# Patient Record
Sex: FEMALE | Race: OTHER | NOT HISPANIC OR LATINO | URBAN - METROPOLITAN AREA
[De-identification: names, ages, dates, MRNs, and addresses within clinical notes are randomized per-mention and may not be internally consistent; named-entity substitution may affect disease eponyms.]

---

## 2018-01-18 ENCOUNTER — OUTPATIENT (OUTPATIENT)
Dept: OUTPATIENT SERVICES | Facility: HOSPITAL | Age: 33
LOS: 1 days | Discharge: HOME | End: 2018-01-18

## 2018-01-18 DIAGNOSIS — D72.829 ELEVATED WHITE BLOOD CELL COUNT, UNSPECIFIED: ICD-10-CM

## 2018-01-22 ENCOUNTER — RESULT REVIEW (OUTPATIENT)
Age: 33
End: 2018-01-22

## 2018-01-22 ENCOUNTER — OUTPATIENT (OUTPATIENT)
Dept: OUTPATIENT SERVICES | Facility: HOSPITAL | Age: 33
LOS: 1 days | End: 2018-01-22
Payer: COMMERCIAL

## 2018-01-22 ENCOUNTER — APPOINTMENT (OUTPATIENT)
Dept: HEMATOLOGY ONCOLOGY | Facility: CLINIC | Age: 33
End: 2018-01-22
Payer: COMMERCIAL

## 2018-01-22 VITALS
RESPIRATION RATE: 16 BRPM | BODY MASS INDEX: 45.69 KG/M2 | HEART RATE: 85 BPM | DIASTOLIC BLOOD PRESSURE: 85 MMHG | HEIGHT: 62.6 IN | SYSTOLIC BLOOD PRESSURE: 129 MMHG | TEMPERATURE: 98.5 F | OXYGEN SATURATION: 98 % | WEIGHT: 254.63 LBS

## 2018-01-22 DIAGNOSIS — D47.3 ESSENTIAL (HEMORRHAGIC) THROMBOCYTHEMIA: ICD-10-CM

## 2018-01-22 DIAGNOSIS — Z78.9 OTHER SPECIFIED HEALTH STATUS: ICD-10-CM

## 2018-01-22 DIAGNOSIS — D72.829 ELEVATED WHITE BLOOD CELL COUNT, UNSPECIFIED: ICD-10-CM

## 2018-01-22 DIAGNOSIS — E03.9 HYPOTHYROIDISM, UNSPECIFIED: ICD-10-CM

## 2018-01-22 LAB
BASOPHILS # BLD AUTO: 0.1 K/UL — SIGNIFICANT CHANGE UP (ref 0–0.2)
BASOPHILS NFR BLD AUTO: 0.7 % — SIGNIFICANT CHANGE UP (ref 0–2)
EOSINOPHIL # BLD AUTO: 0.3 K/UL — SIGNIFICANT CHANGE UP (ref 0–0.5)
EOSINOPHIL NFR BLD AUTO: 2.1 % — SIGNIFICANT CHANGE UP (ref 0–6)
HCT VFR BLD CALC: 40.9 % — SIGNIFICANT CHANGE UP (ref 34.5–45)
HGB BLD-MCNC: 13.5 G/DL — SIGNIFICANT CHANGE UP (ref 11.5–15.5)
LYMPHOCYTES # BLD AUTO: 24.1 % — SIGNIFICANT CHANGE UP (ref 13–44)
LYMPHOCYTES # BLD AUTO: 3.3 K/UL — SIGNIFICANT CHANGE UP (ref 1–3.3)
MCHC RBC-ENTMCNC: 24.9 PG — LOW (ref 27–34)
MCHC RBC-ENTMCNC: 32.9 G/DL — SIGNIFICANT CHANGE UP (ref 32–36)
MCV RBC AUTO: 75.5 FL — LOW (ref 80–100)
MONOCYTES # BLD AUTO: 0.9 K/UL — SIGNIFICANT CHANGE UP (ref 0–0.9)
MONOCYTES NFR BLD AUTO: 6.6 % — SIGNIFICANT CHANGE UP (ref 2–14)
NEUTROPHILS # BLD AUTO: 9.2 K/UL — HIGH (ref 1.8–7.4)
NEUTROPHILS NFR BLD AUTO: 66.4 % — SIGNIFICANT CHANGE UP (ref 43–77)
PLATELET # BLD AUTO: 474 K/UL — HIGH (ref 150–400)
RBC # BLD: 5.42 M/UL — HIGH (ref 3.8–5.2)
RBC # FLD: 15 % — HIGH (ref 10.3–14.5)
WBC # BLD: 13.8 K/UL — HIGH (ref 3.8–10.5)
WBC # FLD AUTO: 13.8 K/UL — HIGH (ref 3.8–10.5)

## 2018-01-22 PROCEDURE — 88184 FLOWCYTOMETRY/ TC 1 MARKER: CPT

## 2018-01-22 PROCEDURE — 81207 BCR/ABL1 GENE MINOR BP: CPT

## 2018-01-22 PROCEDURE — 88189 FLOWCYTOMETRY/READ 16 & >: CPT

## 2018-01-22 PROCEDURE — 88185 FLOWCYTOMETRY/TC ADD-ON: CPT

## 2018-01-22 PROCEDURE — 81206 BCR/ABL1 GENE MAJOR BP: CPT

## 2018-01-22 PROCEDURE — G0452: CPT | Mod: 26

## 2018-01-22 PROCEDURE — 81270 JAK2 GENE: CPT

## 2018-01-22 PROCEDURE — 99204 OFFICE O/P NEW MOD 45 MIN: CPT

## 2018-01-22 RX ORDER — LEVOTHYROXINE SODIUM 0.05 MG/1
50 TABLET ORAL DAILY
Qty: 30 | Refills: 0 | Status: ACTIVE | COMMUNITY
Start: 2018-01-22

## 2018-01-23 LAB — BCR/ABL BY RT - PCR QUANTITATIVE: SIGNIFICANT CHANGE UP

## 2018-01-25 LAB
JAK2 P.V617F BLD/T QL: SIGNIFICANT CHANGE UP
TM INTERPRETATION: SIGNIFICANT CHANGE UP

## 2018-02-18 ENCOUNTER — TRANSCRIPTION ENCOUNTER (OUTPATIENT)
Age: 33
End: 2018-02-18

## 2018-06-11 ENCOUNTER — RESULT REVIEW (OUTPATIENT)
Age: 33
End: 2018-06-11

## 2019-01-05 ENCOUNTER — TRANSCRIPTION ENCOUNTER (OUTPATIENT)
Age: 34
End: 2019-01-05

## 2020-04-25 ENCOUNTER — MESSAGE (OUTPATIENT)
Age: 35
End: 2020-04-25

## 2020-05-01 ENCOUNTER — APPOINTMENT (OUTPATIENT)
Dept: DISASTER EMERGENCY | Facility: HOSPITAL | Age: 35
End: 2020-05-01

## 2020-05-02 LAB
SARS-COV-2 IGG SERPL IA-ACNC: <0.1 INDEX
SARS-COV-2 IGG SERPL QL IA: NEGATIVE

## 2020-05-06 ENCOUNTER — APPOINTMENT (OUTPATIENT)
Dept: DISASTER EMERGENCY | Facility: CLINIC | Age: 35
End: 2020-05-06

## 2020-08-02 ENCOUNTER — TRANSCRIPTION ENCOUNTER (OUTPATIENT)
Age: 35
End: 2020-08-02

## 2021-03-17 ENCOUNTER — NON-APPOINTMENT (OUTPATIENT)
Age: 36
End: 2021-03-17

## 2021-03-17 ENCOUNTER — APPOINTMENT (OUTPATIENT)
Dept: DERMATOLOGY | Facility: CLINIC | Age: 36
End: 2021-03-17
Payer: COMMERCIAL

## 2021-03-17 VITALS — BODY MASS INDEX: 40.48 KG/M2 | WEIGHT: 220 LBS | HEIGHT: 62 IN

## 2021-03-17 DIAGNOSIS — L30.1 DYSHIDROSIS [POMPHOLYX]: ICD-10-CM

## 2021-03-17 DIAGNOSIS — L72.3 SEBACEOUS CYST: ICD-10-CM

## 2021-03-17 PROCEDURE — 99204 OFFICE O/P NEW MOD 45 MIN: CPT

## 2021-03-17 PROCEDURE — 99072 ADDL SUPL MATRL&STAF TM PHE: CPT

## 2021-03-26 ENCOUNTER — NON-APPOINTMENT (OUTPATIENT)
Age: 36
End: 2021-03-26

## 2021-03-26 ENCOUNTER — APPOINTMENT (OUTPATIENT)
Dept: DERMATOLOGY | Facility: CLINIC | Age: 36
End: 2021-03-26
Payer: COMMERCIAL

## 2021-03-26 ENCOUNTER — LABORATORY RESULT (OUTPATIENT)
Age: 36
End: 2021-03-26

## 2021-03-26 PROCEDURE — 11441 EXC FACE-MM B9+MARG 0.6-1 CM: CPT | Mod: GC

## 2021-03-26 PROCEDURE — 13151 CMPLX RPR E/N/E/L 1.1-2.5 CM: CPT | Mod: GC

## 2021-03-26 PROCEDURE — 99072 ADDL SUPL MATRL&STAF TM PHE: CPT

## 2021-04-01 ENCOUNTER — NON-APPOINTMENT (OUTPATIENT)
Age: 36
End: 2021-04-01

## 2021-05-14 ENCOUNTER — LABORATORY RESULT (OUTPATIENT)
Age: 36
End: 2021-05-14

## 2021-05-14 ENCOUNTER — APPOINTMENT (OUTPATIENT)
Dept: DERMATOLOGY | Facility: CLINIC | Age: 36
End: 2021-05-14
Payer: COMMERCIAL

## 2021-05-14 DIAGNOSIS — L72.9 FOLLICULAR CYST OF THE SKIN AND SUBCUTANEOUS TISSUE, UNSPECIFIED: ICD-10-CM

## 2021-05-14 DIAGNOSIS — D48.9 NEOPLASM OF UNCERTAIN BEHAVIOR, UNSPECIFIED: ICD-10-CM

## 2021-05-14 DIAGNOSIS — D23.9 OTHER BENIGN NEOPLASM OF SKIN, UNSPECIFIED: ICD-10-CM

## 2021-05-14 PROCEDURE — 12031 INTMD RPR S/A/T/EXT 2.5 CM/<: CPT

## 2021-05-14 PROCEDURE — 99072 ADDL SUPL MATRL&STAF TM PHE: CPT

## 2021-05-14 PROCEDURE — 11422 EXC H-F-NK-SP B9+MARG 1.1-2: CPT

## 2021-05-16 PROBLEM — L72.9 SCALP CYST: Status: ACTIVE | Noted: 2021-05-16

## 2021-05-16 PROBLEM — D23.9 PILOMATRIXOMA: Status: ACTIVE | Noted: 2021-05-14

## 2021-05-16 PROBLEM — D48.9 NEOPLASM OF UNCERTAIN BEHAVIOR: Status: ACTIVE | Noted: 2021-03-26

## 2021-05-21 ENCOUNTER — NON-APPOINTMENT (OUTPATIENT)
Age: 36
End: 2021-05-21

## 2021-09-13 ENCOUNTER — OUTPATIENT (OUTPATIENT)
Dept: OUTPATIENT SERVICES | Facility: HOSPITAL | Age: 36
LOS: 1 days | End: 2021-09-13

## 2021-09-13 ENCOUNTER — APPOINTMENT (OUTPATIENT)
Dept: PRIMARY CARE | Facility: HOSPITAL | Age: 36
End: 2021-09-13

## 2021-09-13 VITALS
HEIGHT: 62 IN | HEART RATE: 69 BPM | OXYGEN SATURATION: 100 % | RESPIRATION RATE: 18 BRPM | DIASTOLIC BLOOD PRESSURE: 70 MMHG | WEIGHT: 235 LBS | SYSTOLIC BLOOD PRESSURE: 128 MMHG | TEMPERATURE: 98.1 F | BODY MASS INDEX: 43.24 KG/M2

## 2021-09-13 DIAGNOSIS — Z20.822 CONTACT WITH AND (SUSPECTED) EXPOSURE TO COVID-19: ICD-10-CM

## 2021-09-13 DIAGNOSIS — Z00.00 ENCOUNTER FOR GENERAL ADULT MEDICAL EXAMINATION W/OUT ABNORMAL FINDINGS: ICD-10-CM

## 2021-09-13 LAB
COVID-19 SPIKE DOMAIN ANTIBODY INTERPRETATION: POSITIVE
SARS-COV-2 AB SERPL IA-ACNC: >250 U/ML

## 2021-09-14 DIAGNOSIS — Z20.822 CONTACT WITH AND (SUSPECTED) EXPOSURE TO COVID-19: ICD-10-CM

## 2021-09-14 DIAGNOSIS — Z00.00 ENCOUNTER FOR GENERAL ADULT MEDICAL EXAMINATION WITHOUT ABNORMAL FINDINGS: ICD-10-CM

## 2021-09-14 LAB
ALBUMIN SERPL ELPH-MCNC: 4.5 G/DL
ALP BLD-CCNC: 63 U/L
ALT SERPL-CCNC: 17 U/L
ANION GAP SERPL CALC-SCNC: 17 MMOL/L
AST SERPL-CCNC: 25 U/L
BASOPHILS # BLD AUTO: 0.11 K/UL
BASOPHILS NFR BLD AUTO: 1.2 %
BILIRUB SERPL-MCNC: 0.3 MG/DL
BUN SERPL-MCNC: 17 MG/DL
CALCIUM SERPL-MCNC: 9.9 MG/DL
CHLORIDE SERPL-SCNC: 103 MMOL/L
CHOLEST SERPL-MCNC: 217 MG/DL
CO2 SERPL-SCNC: 18 MMOL/L
CREAT SERPL-MCNC: 0.51 MG/DL
EOSINOPHIL # BLD AUTO: 0.17 K/UL
EOSINOPHIL NFR BLD AUTO: 1.8 %
ESTIMATED AVERAGE GLUCOSE: 114 MG/DL
GLUCOSE SERPL-MCNC: 74 MG/DL
HBA1C MFR BLD HPLC: 5.6 %
HCT VFR BLD CALC: 48.2 %
HDLC SERPL-MCNC: 65 MG/DL
HGB BLD-MCNC: 15.3 G/DL
IMM GRANULOCYTES NFR BLD AUTO: 0.5 %
LDLC SERPL CALC-MCNC: 138 MG/DL
LYMPHOCYTES # BLD AUTO: 3.19 K/UL
LYMPHOCYTES NFR BLD AUTO: 33.6 %
MAN DIFF?: NORMAL
MCHC RBC-ENTMCNC: 27.5 PG
MCHC RBC-ENTMCNC: 31.7 GM/DL
MCV RBC AUTO: 86.7 FL
MONOCYTES # BLD AUTO: 0.59 K/UL
MONOCYTES NFR BLD AUTO: 6.2 %
NEUTROPHILS # BLD AUTO: 5.39 K/UL
NEUTROPHILS NFR BLD AUTO: 56.7 %
NONHDLC SERPL-MCNC: 152 MG/DL
PLATELET # BLD AUTO: 417 K/UL
POTASSIUM SERPL-SCNC: 4.4 MMOL/L
PROT SERPL-MCNC: 8.2 G/DL
RBC # BLD: 5.56 M/UL
RBC # FLD: 13.2 %
SARS-COV-2 N GENE NPH QL NAA+PROBE: NOT DETECTED
SODIUM SERPL-SCNC: 138 MMOL/L
TRIGL SERPL-MCNC: 73 MG/DL
TSH SERPL-ACNC: 1.08 UIU/ML
WBC # FLD AUTO: 9.5 K/UL

## 2022-01-13 ENCOUNTER — RESULT REVIEW (OUTPATIENT)
Age: 37
End: 2022-01-13

## 2022-04-15 ENCOUNTER — OUTPATIENT (OUTPATIENT)
Dept: OUTPATIENT SERVICES | Facility: HOSPITAL | Age: 37
LOS: 1 days | End: 2022-04-15
Payer: COMMERCIAL

## 2022-04-15 VITALS
OXYGEN SATURATION: 98 % | RESPIRATION RATE: 16 BRPM | DIASTOLIC BLOOD PRESSURE: 70 MMHG | HEIGHT: 62.5 IN | SYSTOLIC BLOOD PRESSURE: 134 MMHG | TEMPERATURE: 98 F | HEART RATE: 84 BPM | WEIGHT: 240.08 LBS

## 2022-04-15 DIAGNOSIS — N93.9 ABNORMAL UTERINE AND VAGINAL BLEEDING, UNSPECIFIED: ICD-10-CM

## 2022-04-15 DIAGNOSIS — Z98.890 OTHER SPECIFIED POSTPROCEDURAL STATES: Chronic | ICD-10-CM

## 2022-04-15 DIAGNOSIS — Z30.430 ENCOUNTER FOR INSERTION OF INTRAUTERINE CONTRACEPTIVE DEVICE: ICD-10-CM

## 2022-04-15 DIAGNOSIS — E03.9 HYPOTHYROIDISM, UNSPECIFIED: ICD-10-CM

## 2022-04-15 DIAGNOSIS — I10 ESSENTIAL (PRIMARY) HYPERTENSION: ICD-10-CM

## 2022-04-15 LAB
ANION GAP SERPL CALC-SCNC: 14 MMOL/L — SIGNIFICANT CHANGE UP (ref 7–14)
BUN SERPL-MCNC: 13 MG/DL — SIGNIFICANT CHANGE UP (ref 7–23)
CALCIUM SERPL-MCNC: 9.4 MG/DL — SIGNIFICANT CHANGE UP (ref 8.4–10.5)
CHLORIDE SERPL-SCNC: 104 MMOL/L — SIGNIFICANT CHANGE UP (ref 98–107)
CO2 SERPL-SCNC: 22 MMOL/L — SIGNIFICANT CHANGE UP (ref 22–31)
CREAT SERPL-MCNC: 0.61 MG/DL — SIGNIFICANT CHANGE UP (ref 0.5–1.3)
EGFR: 117 ML/MIN/1.73M2 — SIGNIFICANT CHANGE UP
GLUCOSE SERPL-MCNC: 84 MG/DL — SIGNIFICANT CHANGE UP (ref 70–99)
HCG UR QL: NEGATIVE — SIGNIFICANT CHANGE UP
HCT VFR BLD CALC: 42.9 % — SIGNIFICANT CHANGE UP (ref 34.5–45)
HGB BLD-MCNC: 13.7 G/DL — SIGNIFICANT CHANGE UP (ref 11.5–15.5)
MCHC RBC-ENTMCNC: 27.3 PG — SIGNIFICANT CHANGE UP (ref 27–34)
MCHC RBC-ENTMCNC: 31.9 GM/DL — LOW (ref 32–36)
MCV RBC AUTO: 85.5 FL — SIGNIFICANT CHANGE UP (ref 80–100)
NRBC # BLD: 0 /100 WBCS — SIGNIFICANT CHANGE UP
NRBC # FLD: 0 K/UL — SIGNIFICANT CHANGE UP
PLATELET # BLD AUTO: 411 K/UL — HIGH (ref 150–400)
POTASSIUM SERPL-MCNC: 4 MMOL/L — SIGNIFICANT CHANGE UP (ref 3.5–5.3)
POTASSIUM SERPL-SCNC: 4 MMOL/L — SIGNIFICANT CHANGE UP (ref 3.5–5.3)
RBC # BLD: 5.02 M/UL — SIGNIFICANT CHANGE UP (ref 3.8–5.2)
RBC # FLD: 14.6 % — HIGH (ref 10.3–14.5)
SODIUM SERPL-SCNC: 140 MMOL/L — SIGNIFICANT CHANGE UP (ref 135–145)
WBC # BLD: 13.11 K/UL — HIGH (ref 3.8–10.5)
WBC # FLD AUTO: 13.11 K/UL — HIGH (ref 3.8–10.5)

## 2022-04-15 PROCEDURE — 93010 ELECTROCARDIOGRAM REPORT: CPT

## 2022-04-15 RX ORDER — SODIUM CHLORIDE 9 MG/ML
1000 INJECTION, SOLUTION INTRAVENOUS
Refills: 0 | Status: DISCONTINUED | OUTPATIENT
Start: 2022-04-29 | End: 2022-05-13

## 2022-04-15 NOTE — H&P PST ADULT - HISTORY OF PRESENT ILLNESS
36 year old female with pre op dx of abnormal uterine and vaginal bleeding unspecified is scheduled for dilation curettage hysteroscopy with myosure , Mirena intrauterine device insertion

## 2022-04-15 NOTE — H&P PST ADULT - NSANTHOSAYNRD_GEN_A_CORE
PT AMBULATED TO BED 8 WITH STEADY GAIT No. CAROL screening performed.  STOP BANG Legend: 0-2 = LOW Risk; 3-4 = INTERMEDIATE Risk; 5-8 = HIGH Risk

## 2022-04-15 NOTE — H&P PST ADULT - ASSESSMENT
pre op dx of abnormal uterine and vaginal bleeding unspecified is scheduled for dilation curettage hysteroscopy with myosure , Mirena intrauterine device insertion    pre op dx of abnormal uterine and vaginal bleeding unspecified Abnormal uterine and vaginal bleeding unspecified

## 2022-04-15 NOTE — H&P PST ADULT - PROBLEM SELECTOR PLAN 1
Patient tentatively scheduled for  dilation curettage hysteroscopy with myosure , Mirena intrauterine device insertion on 04/29/2022.  Pre-op instructions provided. Pt given verbal and written instructions with teach back on pepcid. Pt verbalized understanding with return demonstration.   Pt strongly advised to follow up with surgeon to discuss COVID testing requirements prior to procedure.   Urine cup provided for day of procedure pregnancy test.

## 2022-04-15 NOTE — H&P PST ADULT - ATTENDING COMMENTS
36 year old female with  abnormal uterine bleeding  scheduled for dilation curettage hysteroscopy with possible Myosure resectoscope and  Mirena intrauterine device insertion. Patient with increased risk of OCP use due to BMI and agrees with Mirena IUD for abnormal bleeding to stop OCP that are not successful in helping with abnormal bleeding. All pros/cons, benefits/risks and associated potential complications were discussed and she signed informed consent.

## 2022-04-15 NOTE — H&P PST ADULT - NSICDXPASTMEDICALHX_GEN_ALL_CORE_FT
PAST MEDICAL HISTORY:  HTN (hypertension)     Hypothyroid     LVH (left ventricular hypertrophy)      PAST MEDICAL HISTORY:  HTN (hypertension)     Hypothyroid     LVH (left ventricular hypertrophy) diagnosed 7 years ago

## 2022-04-28 ENCOUNTER — TRANSCRIPTION ENCOUNTER (OUTPATIENT)
Age: 37
End: 2022-04-28

## 2022-04-28 NOTE — ASU PATIENT PROFILE, ADULT - NSICDXPASTMEDICALHX_GEN_ALL_CORE_FT
PAST MEDICAL HISTORY:  HTN (hypertension)     Hypothyroid     LVH (left ventricular hypertrophy) diagnosed 7 years ago

## 2022-04-28 NOTE — ASU PATIENT PROFILE, ADULT - TOBACCO USE
Implemented All Universal Safety Interventions:  [place] to call system. Call bell, personal items and telephone within reach. Instruct patient to call for assistance. Room bathroom lighting operational. Non-slip footwear when patient is off stretcher. Physically safe environment: no spills, clutter or unnecessary equipment. Stretcher in lowest position, wheels locked, appropriate side rails in place.
Never smoker

## 2022-04-28 NOTE — ASU PATIENT PROFILE, ADULT - FALL HARM RISK - PATIENT NEEDS ASSISTANCE
"Chief Complaint   Patient presents with     Consult     New Patient.       /68 (BP Location: Right arm, Patient Position: Sitting, Cuff Size: Adult Regular)   Pulse 53   Ht 5' 8.58\" (174.2 cm)   Wt 159 lb 13.3 oz (72.5 kg)   BMI 23.89 kg/m      Roxann Masters, Student Medical Assistant  June 14, 2019  "
No assistance needed

## 2022-04-29 ENCOUNTER — TRANSCRIPTION ENCOUNTER (OUTPATIENT)
Age: 37
End: 2022-04-29

## 2022-04-29 ENCOUNTER — RESULT REVIEW (OUTPATIENT)
Age: 37
End: 2022-04-29

## 2022-04-29 ENCOUNTER — OUTPATIENT (OUTPATIENT)
Dept: OUTPATIENT SERVICES | Facility: HOSPITAL | Age: 37
LOS: 1 days | Discharge: HOME | End: 2022-04-29
Payer: COMMERCIAL

## 2022-04-29 VITALS
WEIGHT: 240.08 LBS | RESPIRATION RATE: 15 BRPM | HEART RATE: 77 BPM | OXYGEN SATURATION: 98 % | TEMPERATURE: 99 F | HEIGHT: 62.5 IN

## 2022-04-29 VITALS
HEART RATE: 69 BPM | RESPIRATION RATE: 18 BRPM | TEMPERATURE: 97 F | DIASTOLIC BLOOD PRESSURE: 74 MMHG | OXYGEN SATURATION: 100 % | SYSTOLIC BLOOD PRESSURE: 120 MMHG

## 2022-04-29 DIAGNOSIS — Z98.890 OTHER SPECIFIED POSTPROCEDURAL STATES: Chronic | ICD-10-CM

## 2022-04-29 DIAGNOSIS — Z30.430 ENCOUNTER FOR INSERTION OF INTRAUTERINE CONTRACEPTIVE DEVICE: ICD-10-CM

## 2022-04-29 LAB — HCG UR QL: NEGATIVE — SIGNIFICANT CHANGE UP

## 2022-04-29 PROCEDURE — 88305 TISSUE EXAM BY PATHOLOGIST: CPT | Mod: 26

## 2022-04-29 DEVICE — IUD MIRENA: Type: IMPLANTABLE DEVICE | Status: FUNCTIONAL

## 2022-04-29 RX ORDER — LEVOTHYROXINE SODIUM 125 MCG
1 TABLET ORAL
Qty: 0 | Refills: 0 | DISCHARGE

## 2022-04-29 RX ORDER — IBUPROFEN 200 MG
3 TABLET ORAL
Qty: 0 | Refills: 0 | DISCHARGE

## 2022-04-29 RX ORDER — AMLODIPINE BESYLATE 2.5 MG/1
1 TABLET ORAL
Qty: 0 | Refills: 0 | DISCHARGE

## 2022-04-29 RX ORDER — ONDANSETRON 8 MG/1
4 TABLET, FILM COATED ORAL ONCE
Refills: 0 | Status: COMPLETED | OUTPATIENT
Start: 2022-04-29 | End: 2022-04-29

## 2022-04-29 RX ORDER — ACETAMINOPHEN 500 MG
3 TABLET ORAL
Qty: 0 | Refills: 0 | DISCHARGE

## 2022-04-29 RX ORDER — SODIUM CHLORIDE 9 MG/ML
1000 INJECTION, SOLUTION INTRAVENOUS
Refills: 0 | Status: DISCONTINUED | OUTPATIENT
Start: 2022-04-29 | End: 2022-05-13

## 2022-04-29 RX ORDER — LOSARTAN POTASSIUM 100 MG/1
1 TABLET, FILM COATED ORAL
Qty: 0 | Refills: 0 | DISCHARGE

## 2022-04-29 RX ADMIN — ONDANSETRON 4 MILLIGRAM(S): 8 TABLET, FILM COATED ORAL at 11:58

## 2022-04-29 NOTE — ASU DISCHARGE PLAN (ADULT/PEDIATRIC) - NURSING INSTRUCTIONS
You received IV Tylenol for pain management at ___    . Please DO NOT take any Tylenol (Acetaminophen) containing products, such as Vicodin, Percocet, Excedrin, and cold medications for the next 6 hours (until ___      PM). DO NOT TAKE MORE THAN 3000 MG OF TYLENOL in a 24 hour period.    You received IV Toradol for pain management at ___    . Please DO NOT take Motrin/Ibuprofen/Advil/Aleve/NSAIDs (Non-Steroidal Anti-Inflammatory Drugs) for the next 6 hours (until ___      PM).

## 2022-04-29 NOTE — ASU DISCHARGE PLAN (ADULT/PEDIATRIC) - NS MD DC FALL RISK RISK
For information on Fall & Injury Prevention, visit: [email or web address] OR  [email or web address] OR  https://www.cdc.gov/steadi/patient.html

## 2022-04-29 NOTE — ASU PREOP CHECKLIST - AICD PRESENT
I have never seen this patient before. I am not in the office - not sure why sent it to me.  Please forward to appropriate provider to address it asap. Thanks.    no

## 2022-04-29 NOTE — BRIEF OPERATIVE NOTE - OPERATION/FINDINGS
mirena IUD lot [id number] exp. 4/2024 On EUA, uterus in midposition.  Cervical stenosis w/ difficulty dilating, decision made to proceed w/ hysteroscope and hydrodissect.  Cavity w/o intrauterine pathology, proliferative endometrium on the posterior wall.  Uterus sounded to 10 cm. IUD visualized in correct position on sono.  fluid deficit= 300  mirena IUD lot [id number] exp. 4/2024

## 2022-04-29 NOTE — ASU DISCHARGE PLAN (ADULT/PEDIATRIC) - CARE PROVIDER_API CALL
Rachel Negrete)  Obstetrics and Gynecology  [address] [place] [address]  Phone: [phone]  Fax: [phone]  Follow Up Time:

## 2022-04-29 NOTE — ASU DISCHARGE PLAN (ADULT/PEDIATRIC) - ASU DC SPECIAL INSTRUCTIONSFT
Nothing per vagina for 2 weeks- no douching, tampons, sitz baths, sexual intercourse.  Call your doctor and go to the ER if you experience severe discomfort, chest pain, shortness of breath, fever greater than 100.4, of excessive vaginal bleeding greater than 1 pad/hr for 2 consecutive hours.  Take over the counter and prescribed medications for pain control as directed. Follow up with your doctor in 2 weeks.

## 2022-04-29 NOTE — BRIEF OPERATIVE NOTE - NSICDXBRIEFPROCEDURE_GEN_ALL_CORE_FT
PROCEDURES:  Exam under anesthesia, pelvic 29-Apr-2022 09:15:32  Carmela Grant  Hysteroscopy, with dilation and curettage 29-Apr-2022 09:15:43  Carmela Grant  Insertion of Mirena IUD 29-Apr-2022 09:15:50  Carmela Grant

## 2022-05-05 LAB — SURGICAL PATHOLOGY STUDY: SIGNIFICANT CHANGE UP

## 2022-06-22 ENCOUNTER — NON-APPOINTMENT (OUTPATIENT)
Age: 37
End: 2022-06-22

## 2022-07-11 ENCOUNTER — NON-APPOINTMENT (OUTPATIENT)
Age: 37
End: 2022-07-11

## 2022-08-04 PROBLEM — I10 ESSENTIAL (PRIMARY) HYPERTENSION: Chronic | Status: ACTIVE | Noted: 2022-04-15

## 2022-08-04 PROBLEM — I51.7 CARDIOMEGALY: Chronic | Status: ACTIVE | Noted: 2022-04-15

## 2022-08-04 PROBLEM — E03.9 HYPOTHYROIDISM, UNSPECIFIED: Chronic | Status: ACTIVE | Noted: 2022-04-15

## 2022-08-28 ENCOUNTER — APPOINTMENT (OUTPATIENT)
Dept: MRI IMAGING | Facility: IMAGING CENTER | Age: 37
End: 2022-08-28

## 2022-09-03 ENCOUNTER — APPOINTMENT (OUTPATIENT)
Dept: ULTRASOUND IMAGING | Facility: IMAGING CENTER | Age: 37
End: 2022-09-03

## 2022-09-03 ENCOUNTER — OUTPATIENT (OUTPATIENT)
Dept: OUTPATIENT SERVICES | Facility: HOSPITAL | Age: 37
LOS: 1 days | End: 2022-09-03
Payer: COMMERCIAL

## 2022-09-03 ENCOUNTER — APPOINTMENT (OUTPATIENT)
Dept: MAMMOGRAPHY | Facility: IMAGING CENTER | Age: 37
End: 2022-09-03

## 2022-09-03 DIAGNOSIS — Z98.890 OTHER SPECIFIED POSTPROCEDURAL STATES: Chronic | ICD-10-CM

## 2022-09-03 DIAGNOSIS — Z00.8 ENCOUNTER FOR OTHER GENERAL EXAMINATION: ICD-10-CM

## 2022-09-03 PROCEDURE — 77063 BREAST TOMOSYNTHESIS BI: CPT | Mod: 26

## 2022-09-03 PROCEDURE — 77067 SCR MAMMO BI INCL CAD: CPT | Mod: 26

## 2022-09-03 PROCEDURE — 77067 SCR MAMMO BI INCL CAD: CPT

## 2022-09-03 PROCEDURE — 76641 ULTRASOUND BREAST COMPLETE: CPT | Mod: 26,50

## 2022-09-03 PROCEDURE — 77063 BREAST TOMOSYNTHESIS BI: CPT

## 2022-09-03 PROCEDURE — 76641 ULTRASOUND BREAST COMPLETE: CPT

## 2022-09-14 ENCOUNTER — OUTPATIENT (OUTPATIENT)
Dept: OUTPATIENT SERVICES | Facility: HOSPITAL | Age: 37
LOS: 1 days | End: 2022-09-14
Payer: COMMERCIAL

## 2022-09-14 ENCOUNTER — APPOINTMENT (OUTPATIENT)
Dept: MRI IMAGING | Facility: CLINIC | Age: 37
End: 2022-09-14

## 2022-09-14 DIAGNOSIS — Z98.890 OTHER SPECIFIED POSTPROCEDURAL STATES: Chronic | ICD-10-CM

## 2022-09-14 DIAGNOSIS — Z00.8 ENCOUNTER FOR OTHER GENERAL EXAMINATION: ICD-10-CM

## 2022-09-14 PROCEDURE — 72197 MRI PELVIS W/O & W/DYE: CPT

## 2022-09-14 PROCEDURE — A9585: CPT

## 2022-09-14 PROCEDURE — 72197 MRI PELVIS W/O & W/DYE: CPT | Mod: 26

## 2022-09-21 ENCOUNTER — APPOINTMENT (OUTPATIENT)
Dept: HUMAN REPRODUCTION | Facility: CLINIC | Age: 37
End: 2022-09-21

## 2022-09-21 PROCEDURE — 99205 OFFICE O/P NEW HI 60 MIN: CPT | Mod: 25

## 2022-09-21 PROCEDURE — 36415 COLL VENOUS BLD VENIPUNCTURE: CPT

## 2022-09-21 PROCEDURE — 76830 TRANSVAGINAL US NON-OB: CPT

## 2022-10-13 ENCOUNTER — APPOINTMENT (OUTPATIENT)
Dept: HUMAN REPRODUCTION | Facility: CLINIC | Age: 37
End: 2022-10-13

## 2022-10-13 PROCEDURE — 99215 OFFICE O/P EST HI 40 MIN: CPT | Mod: 95

## 2022-10-17 ENCOUNTER — APPOINTMENT (OUTPATIENT)
Dept: HUMAN REPRODUCTION | Facility: CLINIC | Age: 37
End: 2022-10-17

## 2022-10-17 PROCEDURE — 99213Y: CUSTOM | Mod: 25

## 2022-10-17 PROCEDURE — 36415 COLL VENOUS BLD VENIPUNCTURE: CPT

## 2022-10-17 PROCEDURE — 76830 TRANSVAGINAL US NON-OB: CPT

## 2022-11-03 ENCOUNTER — APPOINTMENT (OUTPATIENT)
Dept: HUMAN REPRODUCTION | Facility: CLINIC | Age: 37
End: 2022-11-03

## 2022-11-03 PROCEDURE — 36415 COLL VENOUS BLD VENIPUNCTURE: CPT

## 2022-11-03 PROCEDURE — 99213 OFFICE O/P EST LOW 20 MIN: CPT | Mod: 25

## 2022-11-03 PROCEDURE — 76830 TRANSVAGINAL US NON-OB: CPT

## 2022-11-10 ENCOUNTER — APPOINTMENT (OUTPATIENT)
Dept: HUMAN REPRODUCTION | Facility: CLINIC | Age: 37
End: 2022-11-10

## 2022-11-10 PROCEDURE — 76830 TRANSVAGINAL US NON-OB: CPT

## 2022-11-10 PROCEDURE — 36415 COLL VENOUS BLD VENIPUNCTURE: CPT

## 2022-11-10 PROCEDURE — S4042: CPT

## 2022-11-16 ENCOUNTER — APPOINTMENT (OUTPATIENT)
Dept: HUMAN REPRODUCTION | Facility: CLINIC | Age: 37
End: 2022-11-16

## 2022-11-16 PROCEDURE — 36415 COLL VENOUS BLD VENIPUNCTURE: CPT

## 2022-11-16 PROCEDURE — 76857 US EXAM PELVIC LIMITED: CPT

## 2022-11-16 PROCEDURE — 99213 OFFICE O/P EST LOW 20 MIN: CPT | Mod: 25

## 2022-11-19 ENCOUNTER — APPOINTMENT (OUTPATIENT)
Dept: HUMAN REPRODUCTION | Facility: CLINIC | Age: 37
End: 2022-11-19

## 2022-11-19 PROCEDURE — 99213 OFFICE O/P EST LOW 20 MIN: CPT | Mod: 25

## 2022-11-19 PROCEDURE — 36415 COLL VENOUS BLD VENIPUNCTURE: CPT

## 2022-11-19 PROCEDURE — 76857 US EXAM PELVIC LIMITED: CPT

## 2022-11-21 ENCOUNTER — APPOINTMENT (OUTPATIENT)
Dept: HUMAN REPRODUCTION | Facility: CLINIC | Age: 37
End: 2022-11-21

## 2022-11-21 PROCEDURE — 36415 COLL VENOUS BLD VENIPUNCTURE: CPT

## 2022-11-21 PROCEDURE — 99213 OFFICE O/P EST LOW 20 MIN: CPT | Mod: 25

## 2022-11-21 PROCEDURE — 76857 US EXAM PELVIC LIMITED: CPT

## 2023-01-17 ENCOUNTER — APPOINTMENT (OUTPATIENT)
Dept: HUMAN REPRODUCTION | Facility: CLINIC | Age: 38
End: 2023-01-17
Payer: COMMERCIAL

## 2023-01-17 PROCEDURE — 99215 OFFICE O/P EST HI 40 MIN: CPT | Mod: 95

## 2023-02-16 ENCOUNTER — TRANSCRIPTION ENCOUNTER (OUTPATIENT)
Age: 38
End: 2023-02-16

## 2023-02-17 ENCOUNTER — TRANSCRIPTION ENCOUNTER (OUTPATIENT)
Age: 38
End: 2023-02-17

## 2023-03-13 ENCOUNTER — APPOINTMENT (OUTPATIENT)
Dept: HUMAN REPRODUCTION | Facility: CLINIC | Age: 38
End: 2023-03-13
Payer: COMMERCIAL

## 2023-03-13 PROCEDURE — 36415 COLL VENOUS BLD VENIPUNCTURE: CPT

## 2023-03-24 ENCOUNTER — APPOINTMENT (OUTPATIENT)
Dept: HUMAN REPRODUCTION | Facility: CLINIC | Age: 38
End: 2023-03-24
Payer: COMMERCIAL

## 2023-03-24 PROCEDURE — 99213 OFFICE O/P EST LOW 20 MIN: CPT | Mod: 25

## 2023-03-24 PROCEDURE — 36415 COLL VENOUS BLD VENIPUNCTURE: CPT

## 2023-03-24 PROCEDURE — 76857 US EXAM PELVIC LIMITED: CPT

## 2023-03-31 ENCOUNTER — APPOINTMENT (OUTPATIENT)
Dept: HUMAN REPRODUCTION | Facility: CLINIC | Age: 38
End: 2023-03-31
Payer: COMMERCIAL

## 2023-03-31 PROCEDURE — 36415 COLL VENOUS BLD VENIPUNCTURE: CPT

## 2023-03-31 PROCEDURE — 99213 OFFICE O/P EST LOW 20 MIN: CPT | Mod: 25

## 2023-03-31 PROCEDURE — 76857 US EXAM PELVIC LIMITED: CPT

## 2023-04-06 ENCOUNTER — APPOINTMENT (OUTPATIENT)
Dept: HUMAN REPRODUCTION | Facility: CLINIC | Age: 38
End: 2023-04-06
Payer: COMMERCIAL

## 2023-04-06 PROCEDURE — 76857 US EXAM PELVIC LIMITED: CPT

## 2023-04-06 PROCEDURE — 36415 COLL VENOUS BLD VENIPUNCTURE: CPT

## 2023-04-06 PROCEDURE — 99213 OFFICE O/P EST LOW 20 MIN: CPT | Mod: 25

## 2023-04-14 ENCOUNTER — APPOINTMENT (OUTPATIENT)
Dept: HUMAN REPRODUCTION | Facility: CLINIC | Age: 38
End: 2023-04-14
Payer: COMMERCIAL

## 2023-04-14 PROCEDURE — 99213 OFFICE O/P EST LOW 20 MIN: CPT | Mod: 25

## 2023-04-14 PROCEDURE — 36415 COLL VENOUS BLD VENIPUNCTURE: CPT

## 2023-04-14 PROCEDURE — 76857 US EXAM PELVIC LIMITED: CPT

## 2023-04-18 ENCOUNTER — APPOINTMENT (OUTPATIENT)
Dept: HUMAN REPRODUCTION | Facility: CLINIC | Age: 38
End: 2023-04-18
Payer: COMMERCIAL

## 2023-04-18 PROCEDURE — 36415 COLL VENOUS BLD VENIPUNCTURE: CPT

## 2023-04-18 PROCEDURE — 76857 US EXAM PELVIC LIMITED: CPT

## 2023-04-18 PROCEDURE — 99213 OFFICE O/P EST LOW 20 MIN: CPT | Mod: 25

## 2023-04-25 ENCOUNTER — APPOINTMENT (OUTPATIENT)
Dept: HUMAN REPRODUCTION | Facility: CLINIC | Age: 38
End: 2023-04-25
Payer: COMMERCIAL

## 2023-04-25 PROCEDURE — 76857 US EXAM PELVIC LIMITED: CPT

## 2023-04-25 PROCEDURE — 36415 COLL VENOUS BLD VENIPUNCTURE: CPT

## 2023-04-25 PROCEDURE — 99213 OFFICE O/P EST LOW 20 MIN: CPT | Mod: 25

## 2023-04-27 ENCOUNTER — APPOINTMENT (OUTPATIENT)
Dept: HUMAN REPRODUCTION | Facility: CLINIC | Age: 38
End: 2023-04-27
Payer: COMMERCIAL

## 2023-04-27 PROCEDURE — 36415 COLL VENOUS BLD VENIPUNCTURE: CPT

## 2023-04-27 PROCEDURE — 99213 OFFICE O/P EST LOW 20 MIN: CPT | Mod: 25

## 2023-04-27 PROCEDURE — 76857 US EXAM PELVIC LIMITED: CPT

## 2023-05-04 ENCOUNTER — APPOINTMENT (OUTPATIENT)
Dept: HUMAN REPRODUCTION | Facility: CLINIC | Age: 38
End: 2023-05-04
Payer: COMMERCIAL

## 2023-05-04 PROCEDURE — 76830 TRANSVAGINAL US NON-OB: CPT

## 2023-05-04 PROCEDURE — 99213 OFFICE O/P EST LOW 20 MIN: CPT | Mod: 25

## 2023-05-04 PROCEDURE — 36415 COLL VENOUS BLD VENIPUNCTURE: CPT

## 2023-05-08 ENCOUNTER — APPOINTMENT (OUTPATIENT)
Dept: HUMAN REPRODUCTION | Facility: CLINIC | Age: 38
End: 2023-05-08
Payer: COMMERCIAL

## 2023-05-08 PROCEDURE — 76857 US EXAM PELVIC LIMITED: CPT

## 2023-05-08 PROCEDURE — 99213 OFFICE O/P EST LOW 20 MIN: CPT | Mod: 25

## 2023-05-08 PROCEDURE — 36415 COLL VENOUS BLD VENIPUNCTURE: CPT

## 2023-05-11 ENCOUNTER — APPOINTMENT (OUTPATIENT)
Dept: HUMAN REPRODUCTION | Facility: CLINIC | Age: 38
End: 2023-05-11
Payer: COMMERCIAL

## 2023-05-11 PROCEDURE — 76857 US EXAM PELVIC LIMITED: CPT

## 2023-05-11 PROCEDURE — 36415 COLL VENOUS BLD VENIPUNCTURE: CPT

## 2023-05-11 PROCEDURE — 99213 OFFICE O/P EST LOW 20 MIN: CPT | Mod: 25

## 2023-06-02 ENCOUNTER — APPOINTMENT (OUTPATIENT)
Dept: BARIATRICS | Facility: CLINIC | Age: 38
End: 2023-06-02
Payer: COMMERCIAL

## 2023-06-02 ENCOUNTER — NON-APPOINTMENT (OUTPATIENT)
Age: 38
End: 2023-06-02

## 2023-06-02 VITALS
OXYGEN SATURATION: 97 % | TEMPERATURE: 97.2 F | WEIGHT: 246 LBS | DIASTOLIC BLOOD PRESSURE: 70 MMHG | BODY MASS INDEX: 44.14 KG/M2 | SYSTOLIC BLOOD PRESSURE: 120 MMHG | HEART RATE: 84 BPM | HEIGHT: 62.5 IN

## 2023-06-02 DIAGNOSIS — E46 UNSPECIFIED PROTEIN-CALORIE MALNUTRITION: ICD-10-CM

## 2023-06-02 DIAGNOSIS — R63.5 ABNORMAL WEIGHT GAIN: ICD-10-CM

## 2023-06-02 DIAGNOSIS — Z86.39 PERSONAL HISTORY OF OTHER ENDOCRINE, NUTRITIONAL AND METABOLIC DISEASE: ICD-10-CM

## 2023-06-02 DIAGNOSIS — Z32.00 ENCOUNTER FOR PREGNANCY TEST, RESULT UNKNOWN: ICD-10-CM

## 2023-06-02 DIAGNOSIS — R63.8 OTHER SYMPTOMS AND SIGNS CONCERNING FOOD AND FLUID INTAKE: ICD-10-CM

## 2023-06-02 DIAGNOSIS — R63.2 POLYPHAGIA: ICD-10-CM

## 2023-06-02 DIAGNOSIS — Z78.9 OTHER SPECIFIED HEALTH STATUS: ICD-10-CM

## 2023-06-02 DIAGNOSIS — Z76.89 PERSONS ENCOUNTERING HEALTH SERVICES IN OTHER SPECIFIED CIRCUMSTANCES: ICD-10-CM

## 2023-06-02 PROCEDURE — 99401 PREV MED CNSL INDIV APPRX 15: CPT | Mod: 25

## 2023-06-02 PROCEDURE — 99204 OFFICE O/P NEW MOD 45 MIN: CPT

## 2023-06-02 RX ORDER — SEMAGLUTIDE 0.25 MG/.5ML
0.25 INJECTION, SOLUTION SUBCUTANEOUS
Qty: 4 | Refills: 0 | Status: DISCONTINUED | COMMUNITY
Start: 2023-06-02 | End: 2023-06-02

## 2023-06-02 RX ORDER — NORGESTIMATE AND ETHINYL ESTRADIOL 7DAYSX3 LO
0.18/0.215/0.25 KIT ORAL
Qty: 28 | Refills: 0 | Status: DISCONTINUED | COMMUNITY
Start: 2017-07-28 | End: 2023-06-02

## 2023-06-02 NOTE — PHYSICAL EXAM
[Normal] : affect appropriate [de-identified] : NC/AT [de-identified] : EOMI, no conjunctival injection, anicteric  [de-identified] : No visualized JVD or audible bruits  [de-identified] : Equal chest rise, non-labored respirations, no audible wheezing  [de-identified] : Regular rate, no audible carotid bruits or JVD appreciated  [de-identified] : soft, NT, ND, no diastasis or hernias appreciated  [de-identified] : CEM

## 2023-06-02 NOTE — HISTORY OF PRESENT ILLNESS
[de-identified] : 37 year old F with a long-standing h/o morbid obesity, who presents today for initial evaluation for non-surgical weight management options.\par \par Heaviest wt 268 lbs, lowest wt 182 lbs (26yo), current wt 246 lbs, Goal weight: 170's lbs; height: 5'2.5"\par Diets/exercise programs tried in the past: Y\par Weight loss medications tried in the past: N\par Reason for stopping weight loss medication if applicable: N/A\par \par History of bariatric surgery: N\par Obesity comorbidities: HTN \par Comorbidities improved/resolved: N\par Current Anti-obesity medication: N\par Obesity medication side effects: N/A\par \par Personal or family history of:\par Pancreatitis: No\par Gallstones: No\par Kidney stones: No\par MEN syndrome: No\par Medullary thyroid cancer: No\par \par No abdominal pain, reflux, nausea, vomiting, constipation or diarrhea.\par \par Current dietary lifestyle:\par Breakfast: Occasionally skips, coffee with almond milk/stevia\par Lunch: greek salad with ranch dressing\par Dinner: chicken/seafood with white rice\par Snacks: fruits, bubble tea, chips/popcorn\par Drinks: water 8oz/day, diet soda 2x/day, bubble tea 1x/wk\par \par Activity Lifestyle: \par Sleep: 7hs/night\par Physical activity/exercise: kickboxing 2x/wk, walking, some strength training\par 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

## 2023-06-02 NOTE — ASSESSMENT
[FreeTextEntry1] : CONRAD ALLISON is a 37 year old F with a long-standing h/o obesity, who presents today for initial evaluation for weight management options. \par I had a lengthy discussion with the patient regarding nutrition, exercise, weight loss medications, and bariatric surgery. The patient qualifies for and is most interested in weight loss medications.\par -------\par Health maintenance and behavioral/nutrition counseling for obesity: An additional 30 minutes of the visit was spent counseling the patient regarding need for aggressive weight loss and behavior modification including nutrition and proper eating habits, including which foods to eat and avoid. \par I had a lengthy discussion regarding the patient's current nutrition and eating habits as detailed above in the HPI. I emphasized the importance of making healthier food choices including fresh fruits and vegetables, lean meats and protein sources. I recommended front loading calories, incorporating whole grains, and eliminating fast foods. I also discussed the importance of avoiding fried/fatty foods and foods containing high sugar content including juices/shakes/sodas/desserts. \par I also encouraged beginning an exercise program and recommended cardiovascular exercises along with strength training to build lean muscle. I made suggestions on different types of exercises to try.\par Patient will work on the following:\par -Meet with nutritionist \par -Eliminate snacks \par -Focus on eating 3 well-balanced meals during the daytime with appropriate portion size\par -Cooking fresh meals rather than take out/processed/ready-made foods\par -Incorporating exercise\par ------------\par Discussed with the pt of the warnings of pregnancy while on Wegovy :\par  - instructed pt to avoid planned pregnancy and use 2 forms of contraception\par  - advised pt to stop Wegovy immediately if becomes pregnant\par Pt verbalized understanding of the above\par \par Return to office in 1 month\par Will begin Wegovy once authorization obtained\par Patient educated to call with questions/concerns\par All questions answered\par \par Additional time spent before and after visit reviewing chart.\par \par \par

## 2023-06-02 NOTE — PLAN
[FreeTextEntry1] : Health maintenance and behavioral/nutrition counseling for obesity: An additional 30 minutes of the visit was spent counseling the patient regarding need for aggressive weight loss and behavior modification including nutrition and proper eating habits, including which foods to eat and avoid.\par I had a lengthy discussion with the patient regarding nutrition, exercise, weight loss medications, and bariatric surgery. The patient qualifies for and is most interested in weight loss medications.\par \par I emphasized the importance of making healthier food choices including fresh fruits and vegetables, lean meats and protein sources. I recommended front loading calories, incorporating whole grains, and eliminating fast foods. I also discussed the importance of avoiding fried/fatty foods and foods containing high sugar content including juices/shakes/sodas/desserts.\par \par I also encouraged beginning an exercise program and recommended cardiovascular exercises along with strength training to build lean muscle. I made suggestions on different types of exercises to try.\par \par Patient will work on the following:\par -Meet with nutritionist\par -Eliminate snacks\par -Focus on eating 3 well-balanced meals during the daytime with appropriate portion size\par -Cooking fresh meals rather than take out/processed/ready-made foods\par -Incorporating exercise, preprinted exercise packet given to pt, various exercises reviewed with pt\par \par Discussed with the pt of the warnings of pregnancy while on weight medications:\par  - instructed pt to avoid planned pregnancy and use 2 forms of contraceptions\par  - advised pt to stop weight medications immediately if becomes pregnant\par Pt verbalizes understanding of the above\par \par Return to office in 1 month\par Will consider wegovy and send prescription once obtain authorization\par All questions answered\par \par Pt seen with Dr. Todd \par \par \par Additional time spent before and after visit reviewing chart.

## 2023-07-25 ENCOUNTER — APPOINTMENT (OUTPATIENT)
Dept: BARIATRICS | Facility: CLINIC | Age: 38
End: 2023-07-25
Payer: COMMERCIAL

## 2023-07-25 VITALS
BODY MASS INDEX: 43.87 KG/M2 | HEART RATE: 85 BPM | OXYGEN SATURATION: 98 % | SYSTOLIC BLOOD PRESSURE: 120 MMHG | WEIGHT: 244.49 LBS | TEMPERATURE: 96.3 F | HEIGHT: 62.5 IN | DIASTOLIC BLOOD PRESSURE: 80 MMHG

## 2023-07-25 PROCEDURE — 99214 OFFICE O/P EST MOD 30 MIN: CPT

## 2023-07-25 RX ORDER — CLOBETASOL PROPIONATE 0.5 MG/G
0.05 CREAM TOPICAL
Qty: 60 | Refills: 0 | Status: DISCONTINUED | COMMUNITY
Start: 2017-08-03 | End: 2023-07-25

## 2023-07-25 RX ORDER — MUPIROCIN 20 MG/G
2 OINTMENT TOPICAL
Qty: 1 | Refills: 1 | Status: DISCONTINUED | COMMUNITY
Start: 2021-03-26 | End: 2023-07-25

## 2023-07-25 RX ORDER — BETAMETHASONE DIPROPIONATE 0.5 MG/G
0.05 OINTMENT, AUGMENTED TOPICAL
Qty: 1 | Refills: 1 | Status: DISCONTINUED | COMMUNITY
Start: 2021-03-17 | End: 2023-07-25

## 2023-07-25 NOTE — HISTORY OF PRESENT ILLNESS
[de-identified] : 37 year old F with a long-standing h/o morbid obesity, who presents today for follow up for non-surgical weight management options.Pt stopped wegovy since she was on a cruise. Restarting today\par \par Heaviest wt 268 lbs, lowest wt 182 lbs (26yo), current wt 244 lbs, Goal weight: 170's lbs; height: 5'2.5"\par Diets/exercise programs tried in the past: Y\par Weight loss medications tried in the past: N\par Reason for stopping weight loss medication if applicable: N/A\par \par History of bariatric surgery: N\par Obesity comorbidities: HTN \par Comorbidities improved/resolved: N\par Current Anti-obesity medication: N\par Obesity medication side effects: N/A\par \par Personal or family history of:\par Pancreatitis: No\par Gallstones: No\par Kidney stones: No\par MEN syndrome: No\par Medullary thyroid cancer: No\par \par No abdominal pain, reflux, nausea, vomiting, constipation or diarrhea.\par \par Current dietary lifestyle:\par Breakfast: Occasionally skips, coffee with almond milk/stevia\par Lunch: greek salad with ranch dressing\par Dinner: chicken/seafood with white rice\par Snacks: fruits, bubble tea, chips/popcorn\par Drinks: water 8oz/day, diet soda 2x/day, bubble tea 1x/wk\par \par Activity Lifestyle: \par Sleep: 7hs/night\par Physical activity/exercise: kickboxing 2x/wk, walking, some strength training\par 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

## 2023-07-25 NOTE — PHYSICAL EXAM
[Normal] : affect appropriate [de-identified] : NC/AT [de-identified] : EOMI, no conjunctival injection, anicteric  [de-identified] : No visualized JVD or audible bruits  [de-identified] : Equal chest rise, non-labored respirations, no audible wheezing  [de-identified] : Regular rate, no audible carotid bruits or JVD appreciated  [de-identified] : soft, NT, ND, no diastasis or hernias appreciated  [de-identified] : CEM

## 2023-07-25 NOTE — ASSESSMENT
[FreeTextEntry1] : CONRAD ALLISON is a 37 year old F with a long-standing h/o obesity, who presents today for follow up with weight management options. \par \par Emphasized the importance of making healthier food choices including fresh fruits and vegetables, lean meats and protein sources.\par Recommended front loading calories, incorporating whole grains, and eliminating fast foods.\par Discussed the importance of avoiding fried/fatty foods and foods containing high sugar content including juices/shakes/sodas/desserts. \par Encouraged beginning an exercise program and recommended cardiovascular exercises along with strength training to build lean muscle. I made suggestions on different types of exercises to try.\par \par Patient will work on the following:\par -Meet with nutritionist \par -Eliminate snacks \par -Focus on eating 3 well-balanced meals during the daytime with appropriate portion size\par -Cooking fresh meals rather than take out/processed/ready-made foods\par -Incorporating exercise\par ------------\par \par Return to office in 1 month with labs\par Restart Wegovy 0.25 mg  and complete first dose\par Rx sent to patient pharmacy for Wegovy 0.5 mg\par Patient educated to call with questions/concerns\par All questions answered\par \par Additional time spent before and after visit reviewing chart.\par \par \par

## 2023-07-25 NOTE — REASON FOR VISIT
[Morbid Obesity (BMI>40)] : morbid obesity (bmi>40) [Other___] : [unfilled] [Follow-Up Visit] : a follow-up visit for

## 2023-08-10 ENCOUNTER — APPOINTMENT (OUTPATIENT)
Dept: BARIATRICS/WEIGHT MGMT | Facility: CLINIC | Age: 38
End: 2023-08-10
Payer: COMMERCIAL

## 2023-08-10 PROCEDURE — 97802 MEDICAL NUTRITION INDIV IN: CPT | Mod: 95

## 2023-08-11 VITALS — WEIGHT: 242 LBS | HEIGHT: 62.5 IN | BODY MASS INDEX: 43.42 KG/M2

## 2023-09-18 LAB
25(OH)D3 SERPL-MCNC: 29.9 NG/ML
ALBUMIN SERPL ELPH-MCNC: 4.5 G/DL
ALP BLD-CCNC: 59 U/L
ALT SERPL-CCNC: 15 U/L
ANION GAP SERPL CALC-SCNC: 13 MMOL/L
APTT BLD: 33.8 SEC
AST SERPL-CCNC: 13 U/L
BILIRUB SERPL-MCNC: 0.3 MG/DL
BUN SERPL-MCNC: 12 MG/DL
CALCIUM SERPL-MCNC: 10.2 MG/DL
CHLORIDE SERPL-SCNC: 102 MMOL/L
CHOLEST SERPL-MCNC: 164 MG/DL
CO2 SERPL-SCNC: 25 MMOL/L
CREAT SERPL-MCNC: 0.65 MG/DL
EGFR: 114 ML/MIN/1.73M2
ESTIMATED AVERAGE GLUCOSE: 108 MG/DL
FERRITIN SERPL-MCNC: 124 NG/ML
FOLATE SERPL-MCNC: 8.5 NG/ML
GLUCOSE SERPL-MCNC: 75 MG/DL
HBA1C MFR BLD HPLC: 5.4 %
HDLC SERPL-MCNC: 53 MG/DL
INR PPP: 0.95 RATIO
IRON SERPL-MCNC: 43 UG/DL
LDLC SERPL CALC-MCNC: 96 MG/DL
NONHDLC SERPL-MCNC: 110 MG/DL
POTASSIUM SERPL-SCNC: 4.5 MMOL/L
PROT SERPL-MCNC: 7.7 G/DL
PT BLD: 10.8 SEC
SODIUM SERPL-SCNC: 140 MMOL/L
TRIGL SERPL-MCNC: 75 MG/DL
TSH SERPL-ACNC: 1.2 UIU/ML
VIT B12 SERPL-MCNC: 814 PG/ML

## 2023-09-21 LAB
VIT A SERPL-MCNC: 42.9 UG/DL
VIT B1 SERPL-MCNC: 133.2 NMOL/L

## 2023-09-22 ENCOUNTER — APPOINTMENT (OUTPATIENT)
Dept: BARIATRICS | Facility: CLINIC | Age: 38
End: 2023-09-22
Payer: COMMERCIAL

## 2023-09-22 VITALS
BODY MASS INDEX: 41.98 KG/M2 | TEMPERATURE: 97 F | WEIGHT: 234 LBS | OXYGEN SATURATION: 99 % | HEART RATE: 76 BPM | SYSTOLIC BLOOD PRESSURE: 120 MMHG | DIASTOLIC BLOOD PRESSURE: 76 MMHG | HEIGHT: 62.5 IN

## 2023-09-22 PROCEDURE — 99214 OFFICE O/P EST MOD 30 MIN: CPT

## 2023-10-25 NOTE — H&P PST ADULT - NSANTHBPHIGHRD_ENT_A_CORE
PAST MEDICAL HISTORY:  Alcohol abuse     GERD (gastroesophageal reflux disease)     Hypertension     Pancreatitis, alcoholic      Yes

## 2023-10-31 ENCOUNTER — APPOINTMENT (OUTPATIENT)
Dept: BARIATRICS | Facility: CLINIC | Age: 38
End: 2023-10-31
Payer: COMMERCIAL

## 2023-10-31 VITALS
DIASTOLIC BLOOD PRESSURE: 76 MMHG | HEIGHT: 62.5 IN | HEART RATE: 81 BPM | TEMPERATURE: 96.7 F | BODY MASS INDEX: 40.19 KG/M2 | OXYGEN SATURATION: 99 % | SYSTOLIC BLOOD PRESSURE: 118 MMHG | WEIGHT: 224 LBS

## 2023-10-31 PROCEDURE — 99213 OFFICE O/P EST LOW 20 MIN: CPT

## 2023-10-31 PROCEDURE — 99203 OFFICE O/P NEW LOW 30 MIN: CPT

## 2023-10-31 RX ORDER — SEMAGLUTIDE 0.25 MG/.5ML
0.25 INJECTION, SOLUTION SUBCUTANEOUS
Qty: 4 | Refills: 0 | Status: DISCONTINUED | COMMUNITY
Start: 2023-06-02 | End: 2023-10-31

## 2023-10-31 RX ORDER — SEMAGLUTIDE 0.5 MG/.5ML
0.5 INJECTION, SOLUTION SUBCUTANEOUS
Qty: 1 | Refills: 0 | Status: DISCONTINUED | COMMUNITY
Start: 2023-07-25 | End: 2023-10-31

## 2023-10-31 RX ORDER — SEMAGLUTIDE 1 MG/.5ML
1 INJECTION, SOLUTION SUBCUTANEOUS
Qty: 1 | Refills: 0 | Status: DISCONTINUED | COMMUNITY
Start: 2023-08-10 | End: 2023-10-31

## 2023-12-14 ENCOUNTER — APPOINTMENT (OUTPATIENT)
Dept: BARIATRICS | Facility: CLINIC | Age: 38
End: 2023-12-14
Payer: COMMERCIAL

## 2023-12-14 VITALS
OXYGEN SATURATION: 97 % | TEMPERATURE: 96.7 F | BODY MASS INDEX: 40.73 KG/M2 | SYSTOLIC BLOOD PRESSURE: 120 MMHG | WEIGHT: 227 LBS | DIASTOLIC BLOOD PRESSURE: 74 MMHG | HEART RATE: 79 BPM | HEIGHT: 62.5 IN

## 2023-12-14 PROCEDURE — 99215 OFFICE O/P EST HI 40 MIN: CPT

## 2023-12-14 NOTE — HISTORY OF PRESENT ILLNESS
[de-identified] : 38 year old F with a longstanding history of obesity presents for a weight-loss medication follow-up. Patient has been on Wegovy 1.7mg for two months; 3lb weight gain since last visit 1 month ago. Patient experienced increased hunger and interested in increasing dosage of medication. Reports no abdominal pain, nausea/vomiting, constipation, diarrhea, reflux/heartburn. Patient eating 2-3 meals/day (B: coffe w/ milk, L: salad w/ shrimp, D: chicken w/pasta), no snacking, drinking approx 30 oz zero-calorie fluids/day, and exercising by kickboxing and weights 1-2x/week; sleeps 6-7 hrs/night.  Starting weight at initial consult: 246 Current weight at today's visit: 224   Anti-obesity medication(s): Wegovy Start date: 6/2/23 Current dose: Wegovy 1.7mg Side-effects from anti-obesity medication(s): none Obesity comorbidities: HTN Comorbidities improved/resolved: n/a History of bariatric surgery: no    Last nutritionist appointment 8/10/23.

## 2023-12-14 NOTE — PHYSICAL EXAM
[Normal] : affect appropriate [de-identified] : Well, healthy-appearing adult [de-identified] : Soft, NT, ND, no diastasis or hernias appreciated [de-identified] : CEM

## 2023-12-14 NOTE — ASSESSMENT
[FreeTextEntry1] : 38 year old F with a longstanding history of obesity presents for a weight-loss medication follow-up. Patient has been on Wegovy 1.7mg for two months; 3lb weight gain since last visit 1 month ago. Patient experienced increased hunger and interested in increasing dosage of medication. Pt doing well.  I had a lengthy discussion with the patient regarding nutrition, exercise, weight loss medications.   Patient will work on the following: -Meet with nutritionist and follow up with nutrition classes -Continue to avoid snacking -Focus on eating 3 well-balanced meals during the daytime with appropriate portion size; front-load calories at breaskfast -Decrease carbohydrates, plate should be mostly vegetables and protein -Cooking fresh meals rather than take out/processed/ready-made foods -Continue exercise by kickboxing and weights -Increase dosage of Wegovy to 2.4mg weekly -Pt to avoid pregnancy while using Wegovy- pt has IUD  Pt will call office immediately for any side effects.  Pt verbalizes understanding and wishes to proceed with medical therapy. Patient educated to call with questions/concerns. All questions answered.   Return to office 1 month   Additional time spent before and after visit reviewing chart.

## 2024-01-18 ENCOUNTER — NON-APPOINTMENT (OUTPATIENT)
Age: 39
End: 2024-01-18

## 2024-01-18 DIAGNOSIS — Z83.3 FAMILY HISTORY OF DIABETES MELLITUS: ICD-10-CM

## 2024-01-18 DIAGNOSIS — Z80.0 FAMILY HISTORY OF MALIGNANT NEOPLASM OF DIGESTIVE ORGANS: ICD-10-CM

## 2024-01-18 DIAGNOSIS — N93.9 ABNORMAL UTERINE AND VAGINAL BLEEDING, UNSPECIFIED: ICD-10-CM

## 2024-01-18 DIAGNOSIS — Z80.3 FAMILY HISTORY OF MALIGNANT NEOPLASM OF BREAST: ICD-10-CM

## 2024-01-18 DIAGNOSIS — Z87.42 PERSONAL HISTORY OF OTHER DISEASES OF THE FEMALE GENITAL TRACT: ICD-10-CM

## 2024-01-18 DIAGNOSIS — Z01.419 ENCOUNTER FOR GYNECOLOGICAL EXAMINATION (GENERAL) (ROUTINE) W/OUT ABNORMAL FINDINGS: ICD-10-CM

## 2024-01-18 DIAGNOSIS — Z87.440 PERSONAL HISTORY OF URINARY (TRACT) INFECTIONS: ICD-10-CM

## 2024-01-18 DIAGNOSIS — Z82.49 FAMILY HISTORY OF ISCHEMIC HEART DISEASE AND OTHER DISEASES OF THE CIRCULATORY SYSTEM: ICD-10-CM

## 2024-02-01 ENCOUNTER — APPOINTMENT (OUTPATIENT)
Dept: BARIATRICS | Facility: CLINIC | Age: 39
End: 2024-02-01
Payer: COMMERCIAL

## 2024-02-01 VITALS
HEART RATE: 79 BPM | DIASTOLIC BLOOD PRESSURE: 80 MMHG | BODY MASS INDEX: 39.91 KG/M2 | WEIGHT: 222.44 LBS | TEMPERATURE: 97.3 F | SYSTOLIC BLOOD PRESSURE: 120 MMHG | HEIGHT: 62.5 IN | OXYGEN SATURATION: 97 %

## 2024-02-01 PROCEDURE — 99214 OFFICE O/P EST MOD 30 MIN: CPT

## 2024-02-02 NOTE — ASSESSMENT
[FreeTextEntry1] : 38 year old woman presents for a weight loss medication follow-up. 5 lb weight loss since last visit; on Wegovy 2.4 mg/week; reports no side effects. Nutrition and exercise guidelines reviewed with the patient.   Continue 3 protein-focused meals/day (aim for 60 g - 70 g protein/day) Encourage zero calorie fluid intake (64 oz/day) Exercise with cardio (aim for 8k-10k steps/day) and strength training 2-3x/week Use step counter Weigh yourself 1x-2x/week Try the Calm effie or Soothing Pod effie for stress management Follow-up with nutritionist (keep a food log) Continue Wegovy 2.4 mg/week (Rx renewed at today's visit); call the office right away with any side effects. Discussed with the pt the importance of avoiding pregnancy while on Wegovy. Complete blood work Follow-up in 3 months, or sooner if needed All questions answered   Call with any questions or concerns   Time before and after visit spent reviewing chart

## 2024-02-02 NOTE — PHYSICAL EXAM
[de-identified] : Well, healthy-appearing adult [Obese, well nourished, in no acute distress] : obese, well nourished, in no acute distress [Normal] : supple without JVD, no thyromegaly or masses appreciated [de-identified] : Equal chest rise, non-labored respirations, no audible wheezing. [de-identified] : Soft, NT, ND, no diastasis or hernias appreciated [de-identified] : CEM

## 2024-02-05 ENCOUNTER — TRANSCRIPTION ENCOUNTER (OUTPATIENT)
Age: 39
End: 2024-02-05

## 2024-02-05 ENCOUNTER — NON-APPOINTMENT (OUTPATIENT)
Age: 39
End: 2024-02-05

## 2024-02-06 ENCOUNTER — OUTPATIENT (OUTPATIENT)
Dept: OUTPATIENT SERVICES | Facility: HOSPITAL | Age: 39
LOS: 1 days | End: 2024-02-06

## 2024-02-06 ENCOUNTER — APPOINTMENT (OUTPATIENT)
Dept: INTERNAL MEDICINE | Facility: CLINIC | Age: 39
End: 2024-02-06

## 2024-02-21 ENCOUNTER — APPOINTMENT (OUTPATIENT)
Dept: OTOLARYNGOLOGY | Facility: CLINIC | Age: 39
End: 2024-02-21
Payer: COMMERCIAL

## 2024-02-21 VITALS
WEIGHT: 222.44 LBS | HEIGHT: 62.5 IN | BODY MASS INDEX: 39.91 KG/M2 | RESPIRATION RATE: 18 BRPM | SYSTOLIC BLOOD PRESSURE: 137 MMHG | HEART RATE: 78 BPM | DIASTOLIC BLOOD PRESSURE: 73 MMHG | OXYGEN SATURATION: 96 %

## 2024-02-21 PROCEDURE — 99204 OFFICE O/P NEW MOD 45 MIN: CPT

## 2024-02-21 RX ORDER — LOSARTAN POTASSIUM 100 MG/1
100 TABLET, FILM COATED ORAL
Qty: 30 | Refills: 0 | Status: ACTIVE | COMMUNITY
Start: 2017-10-09

## 2024-02-21 RX ORDER — LEVONORGESTREL 52 MG/1
INTRAUTERINE DEVICE INTRAUTERINE
Refills: 0 | Status: ACTIVE | COMMUNITY

## 2024-02-21 RX ORDER — AMLODIPINE BESYLATE 5 MG/1
5 TABLET ORAL
Qty: 30 | Refills: 0 | Status: ACTIVE | COMMUNITY
Start: 2017-10-09

## 2024-02-24 NOTE — CONSULT LETTER
[FreeTextEntry2] : Jaun Sanchez MD  [FreeTextEntry3] : Rhoda Linder MD Facial Plastic & Reconstructive Surgery Department of Otolaryngology [address] [place] [address] [phone]

## 2024-02-24 NOTE — PHYSICAL EXAM
[de-identified] : right ear lobe keloid 2x1 cm [Midline] : trachea located in midline position [Normal] : no rashes

## 2024-02-24 NOTE — HISTORY OF PRESENT ILLNESS
[de-identified] : 38 year old female presents with initial evaluation of right outer ear cyst removal.  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  She reports she had keloid on her back also which was removed.  Occasional discomfort to site, worse when wearing a face mask.  Patient denies otalgia, otorrhea, bleeding, drainage, ear infections, hearing loss, tinnitus, dizziness, vertigo, headaches related to hearing.

## 2024-02-28 ENCOUNTER — RESULT REVIEW (OUTPATIENT)
Age: 39
End: 2024-02-28

## 2024-02-28 ENCOUNTER — APPOINTMENT (OUTPATIENT)
Dept: ULTRASOUND IMAGING | Facility: IMAGING CENTER | Age: 39
End: 2024-02-28
Payer: COMMERCIAL

## 2024-02-28 ENCOUNTER — APPOINTMENT (OUTPATIENT)
Dept: MAMMOGRAPHY | Facility: IMAGING CENTER | Age: 39
End: 2024-02-28
Payer: COMMERCIAL

## 2024-02-28 ENCOUNTER — OUTPATIENT (OUTPATIENT)
Dept: OUTPATIENT SERVICES | Facility: HOSPITAL | Age: 39
LOS: 1 days | End: 2024-02-28
Payer: COMMERCIAL

## 2024-02-28 DIAGNOSIS — Z00.8 ENCOUNTER FOR OTHER GENERAL EXAMINATION: ICD-10-CM

## 2024-02-28 PROCEDURE — 77067 SCR MAMMO BI INCL CAD: CPT | Mod: 26

## 2024-02-28 PROCEDURE — 77063 BREAST TOMOSYNTHESIS BI: CPT | Mod: 26

## 2024-02-28 PROCEDURE — 77067 SCR MAMMO BI INCL CAD: CPT

## 2024-02-28 PROCEDURE — 76641 ULTRASOUND BREAST COMPLETE: CPT

## 2024-02-28 PROCEDURE — 77063 BREAST TOMOSYNTHESIS BI: CPT

## 2024-02-28 PROCEDURE — 76641 ULTRASOUND BREAST COMPLETE: CPT | Mod: 26,50

## 2024-03-04 ENCOUNTER — ASOB RESULT (OUTPATIENT)
Age: 39
End: 2024-03-04

## 2024-03-04 ENCOUNTER — APPOINTMENT (OUTPATIENT)
Dept: OBGYN | Facility: CLINIC | Age: 39
End: 2024-03-04
Payer: COMMERCIAL

## 2024-03-04 ENCOUNTER — LABORATORY RESULT (OUTPATIENT)
Age: 39
End: 2024-03-04

## 2024-03-04 VITALS
SYSTOLIC BLOOD PRESSURE: 125 MMHG | HEART RATE: 87 BPM | HEIGHT: 62.5 IN | DIASTOLIC BLOOD PRESSURE: 89 MMHG | BODY MASS INDEX: 40.37 KG/M2 | WEIGHT: 225 LBS

## 2024-03-04 DIAGNOSIS — Z01.419 ENCOUNTER FOR GYNECOLOGICAL EXAMINATION (GENERAL) (ROUTINE) W/OUT ABNORMAL FINDINGS: ICD-10-CM

## 2024-03-04 DIAGNOSIS — N93.0 POSTCOITAL AND CONTACT BLEEDING: ICD-10-CM

## 2024-03-04 DIAGNOSIS — Z97.5 PRESENCE OF (INTRAUTERINE) CONTRACEPTIVE DEVICE: ICD-10-CM

## 2024-03-04 DIAGNOSIS — E28.2 POLYCYSTIC OVARIAN SYNDROME: ICD-10-CM

## 2024-03-04 PROCEDURE — 57505 ENDOCERVICAL CURETTAGE: CPT

## 2024-03-04 PROCEDURE — 76830 TRANSVAGINAL US NON-OB: CPT

## 2024-03-04 PROCEDURE — 99396 PREV VISIT EST AGE 40-64: CPT | Mod: 25

## 2024-03-04 PROCEDURE — 99213 OFFICE O/P EST LOW 20 MIN: CPT | Mod: 25

## 2024-03-04 NOTE — PROCEDURE
[Liquid Base] : liquid base [Cervical Pap Smear] : cervical Pap smear [GC & Chlamydia via Pap] : GC & Chlamydia via Pap [Affirm (Triple Culture)] : Affirm (triple culture) [No Complications] : there were no complications [Tolerated Well] : the patient tolerated the procedure well

## 2024-03-04 NOTE — REVIEW OF SYSTEMS
[Patient Intake Form Reviewed] : Patient intake form was reviewed [Mole Changes] : mole changes [Abn Vaginal bleeding] : abnormal vaginal bleeding [Negative] : Integumentary [de-identified] : noted small area of increased pigment on the buttocks more on rt side - appt with dermatologist

## 2024-03-04 NOTE — PHYSICAL EXAM
[Chaperone Present] : A chaperone was present in the examining room during all aspects of the physical examination [Appropriately responsive] : appropriately responsive [Alert] : alert [No Acute Distress] : no acute distress [No Lymphadenopathy] : no lymphadenopathy [Non-tender] : non-tender [Soft] : soft [Non-distended] : non-distended [Oriented x3] : oriented x3 [Breast Palpation Diffuse Fibrous Tissue Bilateral] : fibrocystic changes [Examination Of The Breasts] : a normal appearance [No Masses] : no breast masses were palpable [Labia Majora] : normal [Labia Minora] : normal [Normal] : normal [FreeTextEntry1] : buttocks close to perianal and perineum noted increased pigmented lesions  [FreeTextEntry8] : limited exam by body habitus unable to palpate adnexa

## 2024-03-04 NOTE — PLAN
[FreeTextEntry1] : Annual gyn exam  Postcoital bleeding - AUB  buttocks lesions close to perineum - to see dermatologist.  TV Sonogram to check IUD due to AUB , Postcoital bleeding - noted IUD in proper position and normal uterus and dominant follicles and septate on right ovary.  Mammogram / B Sonogram noted normal

## 2024-03-04 NOTE — HISTORY OF PRESENT ILLNESS
[IUD] : has an intrauterine device [Y] : Patient uses contraception [FreeTextEntry1] : Annual [Mammogramdate] : 2/2024 [TextBox_4] : 39YO patient presents for annual gyn exam. c/o postcoital bleeding for last 3 months  c/o lesions of skin on the buttocks close to perineum - appt with Dermatologist next week  [HPVDate] : 2/2022 [BreastSonogramDate] : 2/2024 [PapSmeardate] : 2/2022 [de-identified] : has a Mirena [LMPDate] : 2/1/24 [TextBox_28] : with menses with Mirena but usually light but in the last 3 months noted with postcoital bleeding  [TextBox_34] : Pain of both breasts diffuse  Patietn states will not stop caffeine (+) Fibrocystic breasts.  Mammogram/B sonogram last week normal.

## 2024-03-13 ENCOUNTER — APPOINTMENT (OUTPATIENT)
Dept: OTOLARYNGOLOGY | Facility: CLINIC | Age: 39
End: 2024-03-13
Payer: COMMERCIAL

## 2024-03-13 ENCOUNTER — APPOINTMENT (OUTPATIENT)
Dept: OTOLARYNGOLOGY | Facility: CLINIC | Age: 39
End: 2024-03-13

## 2024-03-13 VITALS
BODY MASS INDEX: 40.37 KG/M2 | HEART RATE: 71 BPM | HEIGHT: 62.5 IN | WEIGHT: 225 LBS | SYSTOLIC BLOOD PRESSURE: 130 MMHG | DIASTOLIC BLOOD PRESSURE: 82 MMHG

## 2024-03-13 PROCEDURE — 14060 TIS TRNFR E/N/E/L 10 SQ CM/<: CPT

## 2024-03-13 PROCEDURE — 21011 EXC FACE LES SC <2 CM: CPT

## 2024-03-15 NOTE — PROCEDURE
[FreeTextEntry1] : right ear keloid excision, adjacent tissue transfer 10 cm 2 [FreeTextEntry2] : right ear keloid 1.5 cm [FreeTextEntry3] : Patient name: CONRAD ALLISON   MRN: [id number]   Date: Mar 13 2024  4:45PM   Surgeon: Rhoda Linder MD  Assistants: None  Procedure: Excision of right ear keloid, adjacent tissue transfer 10 cm2  Pre-operative diagnosis: Right ear keloid Post-operative diagnosis: same  Indications: This is a 38 year female with a right ear keloid that has been growing in size. All RBA were discussed with the patient, including risk of scar, infection wound dehiscence.  Procedure: After written informed consent and a surgical timeout, the area was injected with 1% lidocaine with epinephrine and 0.25% marcaine with epinephrine 1:1 mixture. After anesthesia, the area was prepped with iodine and draped in a sterile fashion. A elliptical incision was made with a 15 blade around the ear keloid. The lesion was released from the deep tissue layer and release from the preauricular area. The lesion was sent in formalin to pathology. This was 1.5 cm in size. The wound was irrigated. Hemostasis was achieved. There was a defect approximated 3x2 cm in size. An anterior cheek flap was elevated and rotated into the defect. The subcutaneous layer was closed with a 5-0 monocryl suture. The skin edges were reapproximated with a 6-0 nylon suture. The wound was cleaned and bacitracin was applied. A pressure dressing was then applied. Patient tolerated well.  EBL: Minimal  Specimens: right ear lesion  Disposition: Discharge to home. Post-op instructions given.

## 2024-03-19 LAB — CORE LAB BIOPSY: NORMAL

## 2024-03-20 ENCOUNTER — APPOINTMENT (OUTPATIENT)
Dept: OTOLARYNGOLOGY | Facility: CLINIC | Age: 39
End: 2024-03-20
Payer: COMMERCIAL

## 2024-03-20 VITALS
DIASTOLIC BLOOD PRESSURE: 84 MMHG | BODY MASS INDEX: 40.85 KG/M2 | HEIGHT: 62 IN | SYSTOLIC BLOOD PRESSURE: 139 MMHG | WEIGHT: 222 LBS | HEART RATE: 79 BPM

## 2024-03-20 PROCEDURE — 99024 POSTOP FOLLOW-UP VISIT: CPT

## 2024-03-20 NOTE — REASON FOR VISIT
What Type Of Note Output Would You Prefer (Optional)?: Standard Output What Is The Reason For Today's Visit?: Annual Full Body Skin Examination with No Concerns [de-identified] : right ear keloid excision, adjacent tissue transfer 10 cm 2.   What Is The Reason For Today's Visit? (Being Monitored For X): concerning skin lesions on an annual basis [de-identified] : 3/13/24 [de-identified] : 38 year old female s/p right ear keloid excision, adjacent tissue transfer 10 cm 2. Path :  Right ear lesion, excisional biopsy:- Keloid. Reports itchyness and redness at site of excision. Denies pain, bleeding, fevers.

## 2024-03-20 NOTE — ASSESSMENT
[FreeTextEntry1] : 38 year old female with right ear lobe keloid sp excision. K40 injected today. She has a allergic reaction of skin with rash surrounding sutures. We discussed HC ointment, antihistamines for a week. She will return in 1 month for another steroid injected to area with Dr. Joy. Then see me in June.

## 2024-03-26 ENCOUNTER — APPOINTMENT (OUTPATIENT)
Dept: DERMATOLOGY | Facility: CLINIC | Age: 39
End: 2024-03-26
Payer: COMMERCIAL

## 2024-03-26 DIAGNOSIS — D22.9 MELANOCYTIC NEVI, UNSPECIFIED: ICD-10-CM

## 2024-03-26 DIAGNOSIS — L82.1 OTHER SEBORRHEIC KERATOSIS: ICD-10-CM

## 2024-03-26 PROCEDURE — 17110 DESTRUCTION B9 LES UP TO 14: CPT

## 2024-03-26 PROCEDURE — 99204 OFFICE O/P NEW MOD 45 MIN: CPT | Mod: 25

## 2024-03-26 RX ORDER — METRONIDAZOLE 7.5 MG/G
0.75 CREAM TOPICAL
Qty: 1 | Refills: 5 | Status: ACTIVE | COMMUNITY
Start: 2024-03-26 | End: 1900-01-01

## 2024-03-26 NOTE — PHYSICAL EXAM
[Alert] : alert [Oriented x 3] : ~L oriented x 3 [Full Body Skin Exam Performed] : performed [FreeTextEntry3] : PE:   General: well-appearing, alert, in no acute distress   Full body skin exam performed examining scalp, head, face, ears, eyes, mouth, neck, chest, back, abdomen, axilla, b/l arms, b/l forearms, b/l hands, b/l fingernails, b/l thighs, b/l legs, b/l feet, b/l toenails Pertinent findings include: -fingernails and toenails covered with opaque nail polish, not examined -erythematous papules on the bl cheeks -brown papules, thin plaques x 10 on the perianal area -scattered light brown to dark brown colored <6mm papules and macules without any irregular border, color, or asymmetry on the trunk and extremities, consistent with benign nevi. -brown stuck on papules, plaques on the trunk and extremities

## 2024-03-26 NOTE — HISTORY OF PRESENT ILLNESS
[FreeTextEntry1] : tbse, skin lesions [de-identified] : 39yo F presents for tbse recently had a cyst on the upper buttock which has now resolved but noticed brown skin lesions in the perianal area that she never noticed before recently had a pap, was told HPV positive and is scheduled for colposcopy did not receive gardasil vaccine No personal or family hx of skin cancer  also with red bumps on the cheeks

## 2024-03-26 NOTE — ASSESSMENT
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 The patient expressed understanding of these risks and agreed to the procedure. 10 lesions treated with 2 cycles of LN2. The procedure was well tolerated, without complication. Wound care was reviewed.  #Rosacea - chronic, flaring -I discussed the chronic nature of this condition -start metrocream bid to affected areas -trigger avoidance -daily sun protection with SPF 30 or higher, use non-comedogenic sunscreen  #Multiple benign nevi - chronic, stable - I discussed the chronic nature and course of the condition - Photoprotection discussed, recommend daily broad-spectrum sunscreen, SPF 30 or greater, UPF hat, clothing. - Pt educated on ABCDE of melanoma - Recommend self-skin exam and annual skin exam by MD - Pt instructed to return for new or changing lesions especially if any moles start to change, itch, or bleed   # Seborrheic keratosis, trunk/extremities  - chronic, stable -I discussed the chronic nature and course of the condition -reviewed benign nature   RTC 4-6 weeks repeat cryo

## 2024-03-31 LAB
A VAGINAE DNA VAG QL NAA+PROBE: NORMAL
BVAB2 DNA VAG QL NAA+PROBE: NORMAL
C KRUSEI DNA VAG QL NAA+PROBE: NEGATIVE
C TRACH RRNA SPEC QL NAA+PROBE: NEGATIVE
CANDIDA DNA VAG QL NAA+PROBE: NEGATIVE
CORE LAB BIOPSY: NORMAL
MEGA1 DNA VAG QL NAA+PROBE: NORMAL
N GONORRHOEA RRNA SPEC QL NAA+PROBE: NEGATIVE
T VAGINALIS RRNA SPEC QL NAA+PROBE: NEGATIVE

## 2024-04-12 LAB
CYTOLOGY CVX/VAG DOC THIN PREP: ABNORMAL
HPV HIGH+LOW RISK DNA PNL CVX: DETECTED

## 2024-04-26 ENCOUNTER — APPOINTMENT (OUTPATIENT)
Dept: OTOLARYNGOLOGY | Facility: CLINIC | Age: 39
End: 2024-04-26
Payer: COMMERCIAL

## 2024-04-26 VITALS
HEART RATE: 76 BPM | HEIGHT: 62 IN | BODY MASS INDEX: 40.85 KG/M2 | WEIGHT: 222 LBS | DIASTOLIC BLOOD PRESSURE: 85 MMHG | SYSTOLIC BLOOD PRESSURE: 131 MMHG

## 2024-04-26 DIAGNOSIS — L91.0 HYPERTROPHIC SCAR: ICD-10-CM

## 2024-04-26 PROCEDURE — 99024 POSTOP FOLLOW-UP VISIT: CPT

## 2024-04-26 RX ORDER — CEPHALEXIN 500 MG/1
500 CAPSULE ORAL
Qty: 14 | Refills: 0 | Status: COMPLETED | COMMUNITY
Start: 2024-03-20 | End: 2024-04-26

## 2024-04-26 NOTE — PHYSICAL EXAM
[Normal] : tympanic membranes are normal in both ears [de-identified] : healing well slightly thickened but no keloid formation.

## 2024-04-26 NOTE — REASON FOR VISIT
[Post-Operative Visit] : a post-operative visit [FreeTextEntry2] : s/p right keloid earlobe excision 03/18/24

## 2024-04-26 NOTE — END OF VISIT
[FreeTextEntry3] : I personally saw and examined the patient in detail. I spoke to FERN Tran regarding the assessment and plan of care.  I preformed the procedures and I reviewed the above assessment and plan of care, and agree. I have made changes in changes in the body of the note where appropriate.

## 2024-04-26 NOTE — HISTORY OF PRESENT ILLNESS
[de-identified] : 38 year old female presents for post op visit. Here today for 2nd steroid injection.  s/p right ear keloid excision, adjacent tissue transfer 10 cm 2 3/13/24. Kenalog 40 injected last visit 3/20/24.  Denies pain swelling bleeding pus or fevers.

## 2024-04-26 NOTE — PROCEDURE
[FreeTextEntry3] : Kenalog 40 was used to inject the hypertrophic/keloid area after obtaining verbal consent.  Skin was first cleansed with alcohol.  Then 0.1 cc k40 was injected with 27 gauge needle.  Patient tolerated this very well.  NDC: 0162-9964-45, single dose vial  LOT [id number] Exp 09/25 NDC [id number]

## 2024-04-26 NOTE — ASSESSMENT
[FreeTextEntry1] : 38 year old female presents s/p right ear keloid excision, adjacent tissue transfer 10 cm 2 3/13/24 Dr Linder.  Kenalog 40 injected last visit 3/20/24.  - injected kenalog 40, 0.1cc  - continue stretching - follow up in 1 month for next injection.  Can follow-up with Dr. Linder after that

## 2024-05-07 ENCOUNTER — APPOINTMENT (OUTPATIENT)
Dept: DERMATOLOGY | Facility: CLINIC | Age: 39
End: 2024-05-07
Payer: COMMERCIAL

## 2024-05-07 DIAGNOSIS — L71.9 ROSACEA, UNSPECIFIED: ICD-10-CM

## 2024-05-07 DIAGNOSIS — A63.0 ANOGENITAL (VENEREAL) WARTS: ICD-10-CM

## 2024-05-07 PROCEDURE — 17110 DESTRUCTION B9 LES UP TO 14: CPT

## 2024-05-07 PROCEDURE — 99213 OFFICE O/P EST LOW 20 MIN: CPT | Mod: 25

## 2024-05-07 NOTE — HISTORY OF PRESENT ILLNESS
[FreeTextEntry1] : f/u condyloma rosacea [de-identified] : 39yo F presents for perianal condyloma f/u s/p cryo at  3/26/24 with improvement, not really sure herself but had her partner check yesterday and they think its improving recently had a pap, was told HPV positive and is scheduled for colposcopy tomorrow did not receive gardasil vaccine will talk to her ob/gyn tomorrow No personal or family hx of skin cancer  also with red bumps on the cheeks, hasn't started metrocream yet  social hx: ob/gyn NP at [facility]

## 2024-05-07 NOTE — ASSESSMENT
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, flaring -I have discussed the chronic nature and course of this condition -reviewed precautions, can spread via sexual contact, pt said her partner had no lesions -recommend routine health screening, pt to have colposcopy with her GYN due to abnormal pap and HPV+, scheduled for tomorrow, will also talk about Gardasil with her gyn tomorrow -reviewed tx options including cryo and imiquimod, pt opts for cryo again today; session #2 today The risks/benefits/alternatives of cryo-destruction was explained to the patient which, include but are not limited to redness, swelling, pain, blistering, scar, discoloration of skin, and recurrence. The patient expressed understanding of these risks and agreed to the procedure. 10 lesions treated with 2 cycles of LN2. The procedure was well tolerated, without complication. Wound care was reviewed.  #Rosacea - chronic, flaring -I discussed the chronic nature of this condition -re-educated to start metrocream bid to affected areas -trigger avoidance -daily sun protection with SPF 30 or higher, use non-comedogenic sunscreen  RTC 4-6 weeks repeat cryo

## 2024-05-07 NOTE — PHYSICAL EXAM
[Alert] : alert [Oriented x 3] : ~L oriented x 3 [Declined] : declined [FreeTextEntry3] : PE:   General: well-appearing, alert, in no acute distress    Pertinent findings include: -erythematous papules on the bl cheeks -brown papules, thin plaques x 10 on the perianal area, improving from prior

## 2024-05-08 ENCOUNTER — APPOINTMENT (OUTPATIENT)
Dept: BARIATRICS | Facility: CLINIC | Age: 39
End: 2024-05-08
Payer: COMMERCIAL

## 2024-05-08 ENCOUNTER — APPOINTMENT (OUTPATIENT)
Dept: OBGYN | Facility: CLINIC | Age: 39
End: 2024-05-08
Payer: COMMERCIAL

## 2024-05-08 VITALS
HEIGHT: 62.5 IN | DIASTOLIC BLOOD PRESSURE: 84 MMHG | HEART RATE: 81 BPM | BODY MASS INDEX: 41.09 KG/M2 | WEIGHT: 229 LBS | SYSTOLIC BLOOD PRESSURE: 134 MMHG

## 2024-05-08 VITALS
HEART RATE: 82 BPM | SYSTOLIC BLOOD PRESSURE: 116 MMHG | TEMPERATURE: 97.2 F | OXYGEN SATURATION: 95 % | HEIGHT: 62.5 IN | WEIGHT: 228.4 LBS | DIASTOLIC BLOOD PRESSURE: 80 MMHG | BODY MASS INDEX: 40.98 KG/M2

## 2024-05-08 DIAGNOSIS — R63.5 ABNORMAL WEIGHT GAIN: ICD-10-CM

## 2024-05-08 DIAGNOSIS — R87.610 ATYPICAL SQUAMOUS CELLS OF UNDETERMINED SIGNIFICANCE ON CYTOLOGIC SMEAR OF CERVIX (ASC-US): ICD-10-CM

## 2024-05-08 DIAGNOSIS — I10 ESSENTIAL (PRIMARY) HYPERTENSION: ICD-10-CM

## 2024-05-08 DIAGNOSIS — E66.01 MORBID (SEVERE) OBESITY DUE TO EXCESS CALORIES: ICD-10-CM

## 2024-05-08 PROCEDURE — 99214 OFFICE O/P EST MOD 30 MIN: CPT

## 2024-05-08 PROCEDURE — 99213 OFFICE O/P EST LOW 20 MIN: CPT | Mod: 25

## 2024-05-08 PROCEDURE — G2211 COMPLEX E/M VISIT ADD ON: CPT

## 2024-05-08 PROCEDURE — 57454 BX/CURETT OF CERVIX W/SCOPE: CPT

## 2024-05-08 RX ORDER — SEMAGLUTIDE 2.4 MG/.75ML
2.4 INJECTION, SOLUTION SUBCUTANEOUS
Qty: 3 | Refills: 0 | Status: ACTIVE | COMMUNITY
Start: 2023-09-18 | End: 1900-01-01

## 2024-05-08 NOTE — PHYSICAL EXAM
[Chaperone Present] : A chaperone was present in the examining room during all aspects of the physical examination [[id number]] : A chaperone was present during the pelvic exam. [FreeTextEntry2] : JUSTO Yadav

## 2024-05-08 NOTE — PHYSICAL EXAM
[Normal] : affect appropriate [de-identified] : Well, healthy-appearing adult [de-identified] : Soft, NT, ND, no diastasis or hernias appreciated [de-identified] : CEM

## 2024-05-08 NOTE — HISTORY OF PRESENT ILLNESS
[de-identified] : 38 year old F with a longstanding history of obesity presents for a weight-loss medication follow-up. Pt currently on Wegovy 2.4mg; 6lb weight gain since last visit 3 months ago. Patient states she no longer feels appetite suppression on Wegovy and has been able to eat larger portions again. Reports no abdominal pain, nausea/vomiting, constipation, diarrhea, reflux/heartburn. Pt is interested in increasing dosage of medication   Patient eating 2-3 meals/day (skips breakfast) and daytime snacking on peanuts, drinking 24-32oz zero-calorie fluids/day, and exercising by daily walking and kickboxing 1x/week; sleeps 7-8 hrs/night.   Starting weight at initial consult: 246lb Current weight at today's visit: 228lb   Anti-obesity medication(s): Wegovy Start date: 06/2023 Current dose: Wegovy 2.4mg Side-effects from anti-obesity medication(s): none Obesity comorbidities: HTN, PCOS Comorbidities improved/resolved: none History of bariatric surgery: no

## 2024-05-08 NOTE — PROCEDURE
[Colposcopy] : Colposcopy  [Consent Obtained] : Consent obtained [Risks] : risks [Patient] : patient [ASCUS] : ASCUS [No Premedication] : no premedication [Colposcopy Adequate] : colposcopy adequate [SCI Fully Visualized] : SCI fully visualized [ECC Performed] : ECC performed [Lesion] : lesion seen [Biopsy] : biopsy taken [de-identified] : 2 [de-identified] : 12oclock ECC

## 2024-05-08 NOTE — HISTORY OF PRESENT ILLNESS
[Patient reported mammogram was normal] : Patient reported mammogram was normal [Patient reported breast sonogram was normal] : Patient reported breast sonogram was normal [Patient reported PAP Smear was abnormal] : Patient reported PAP Smear was abnormal [LMP unknown] : LMP unknown [No] : never pregnant [unknown] : Patient is unsure of the date of her LMP [FreeTextEntry1] : 39 yo presents for colposcopy  [Mammogramdate] : 2/28/24 [BreastSonogramDate] : 2/28/24 [PapSmeardate] : 3/4/24 [TextBox_31] : + hpv ASCUS Epithelial cells

## 2024-05-08 NOTE — ASSESSMENT
[FreeTextEntry1] : 38 year old F with a longstanding history of obesity presents for a weight-loss medication follow-up. Pt currently on Wegovy 2.4mg; 6lb weight gain since last visit 3 months ago. Patient no longer finds Wegovy effective and interested in trying alternative medications.  I had a lengthy discussion with the patient regarding nutrition, exercise, weight loss medications. The patient qualifies for bariatric surgery but is not interested at this time. We reviewed surgical options such as the gastric RNY bypass and sleeve gastrectomy and the risks and benefits. We discuss how bariatric surgery may offer greater weight loss results with better long-term success. Patient qualifies for and is currently most interested in trying an alternative weight loss medications; patient would prefer trying conservative measures before considering bariatric surgery.   Patient will work on the following: -Eliminate snacks and daytime grazing -Focus on eating 3 well-balanced meals during the daytime with appropriate portion size; reduce carbohydrate intake -Cooking fresh meals rather than take out/processed/ready-made foods -Drinking 64oz of zero-calorie liquids daily -Incorporating exercise; walk 8-10k steps daily; add cardio and strength training -Bloodwork done Sept 2023. Patient to obtain new bloodwork done at this time. -Start Zepbound 5mg pending bloodwork results and authorization (okay to uptitrate Zepbound quicker as she has tolerated Wegovy 2.4mg without adverse effects). Continue with Wegovy 2.4mg in the interim.   Discussed with the pt of the warnings of pregnancy while on Wegovy/Zepbound: - instructed pt to avoid planned pregnancy and use of contraception (pt currently has IUD) - advised pt to stop immediately if becomes pregnant Pt verbalized understanding of the above   Pt will call office immediately for any side effects. Pt verbalizes understanding and wishes to proceed with medical therapy. Patient educated to call with questions/concerns. All questions answered.   Return to office 3 months if on Wegovy, or 3 weeks after starting Zepbound.    Additional time spent before and after visit reviewing chart.d

## 2024-05-14 LAB
25(OH)D3 SERPL-MCNC: 30.1 NG/ML
ALBUMIN SERPL ELPH-MCNC: 4.4 G/DL
ALP BLD-CCNC: 72 U/L
ALT SERPL-CCNC: 11 U/L
ANION GAP SERPL CALC-SCNC: 10 MMOL/L
AST SERPL-CCNC: 12 U/L
BASOPHILS # BLD AUTO: 0.08 K/UL
BASOPHILS NFR BLD AUTO: 0.6 %
BILIRUB SERPL-MCNC: 0.3 MG/DL
BUN SERPL-MCNC: 13 MG/DL
CALCIUM SERPL-MCNC: 9.7 MG/DL
CHLORIDE SERPL-SCNC: 105 MMOL/L
CHOLEST SERPL-MCNC: 165 MG/DL
CO2 SERPL-SCNC: 27 MMOL/L
CREAT SERPL-MCNC: 0.61 MG/DL
EGFR: 116 ML/MIN/1.73M2
EOSINOPHIL # BLD AUTO: 0.12 K/UL
EOSINOPHIL NFR BLD AUTO: 0.9 %
ESTIMATED AVERAGE GLUCOSE: 100 MG/DL
GLUCOSE SERPL-MCNC: 95 MG/DL
HBA1C MFR BLD HPLC: 5.1 %
HCG SERPL-MCNC: <1 MIU/ML
HCT VFR BLD CALC: 43.2 %
HDLC SERPL-MCNC: 52 MG/DL
HGB BLD-MCNC: 14.3 G/DL
IMM GRANULOCYTES NFR BLD AUTO: 0.7 %
LDLC SERPL CALC-MCNC: 96 MG/DL
LYMPHOCYTES # BLD AUTO: 2.96 K/UL
LYMPHOCYTES NFR BLD AUTO: 22.6 %
MAN DIFF?: NORMAL
MCHC RBC-ENTMCNC: 29 PG
MCHC RBC-ENTMCNC: 33.1 GM/DL
MCV RBC AUTO: 87.6 FL
MONOCYTES # BLD AUTO: 0.89 K/UL
MONOCYTES NFR BLD AUTO: 6.8 %
NEUTROPHILS # BLD AUTO: 8.96 K/UL
NEUTROPHILS NFR BLD AUTO: 68.4 %
NONHDLC SERPL-MCNC: 113 MG/DL
PLATELET # BLD AUTO: 479 K/UL
POTASSIUM SERPL-SCNC: 4.4 MMOL/L
PROT SERPL-MCNC: 7.5 G/DL
RBC # BLD: 4.93 M/UL
RBC # FLD: 13.2 %
SODIUM SERPL-SCNC: 142 MMOL/L
TRIGL SERPL-MCNC: 87 MG/DL
TSH SERPL-ACNC: 0.93 UIU/ML
VIT B6 SERPL-MCNC: 6 UG/L
WBC # FLD AUTO: 13.1 K/UL
ZINC SERPL-MCNC: 89 UG/DL

## 2024-05-14 RX ORDER — TIRZEPATIDE 5 MG/.5ML
5 INJECTION, SOLUTION SUBCUTANEOUS
Qty: 1 | Refills: 0 | Status: ACTIVE | COMMUNITY
Start: 2024-05-14 | End: 1900-01-01

## 2024-05-20 LAB — CORE LAB BIOPSY: NORMAL

## 2024-05-21 ENCOUNTER — APPOINTMENT (OUTPATIENT)
Dept: OBGYN | Facility: CLINIC | Age: 39
End: 2024-05-21
Payer: COMMERCIAL

## 2024-05-21 DIAGNOSIS — Z23 ENCOUNTER FOR IMMUNIZATION: ICD-10-CM

## 2024-05-21 PROCEDURE — 90471 IMMUNIZATION ADMIN: CPT

## 2024-05-21 PROCEDURE — 90651 9VHPV VACCINE 2/3 DOSE IM: CPT

## 2024-05-24 ENCOUNTER — APPOINTMENT (OUTPATIENT)
Dept: OTOLARYNGOLOGY | Facility: CLINIC | Age: 39
End: 2024-05-24
Payer: COMMERCIAL

## 2024-05-24 VITALS
HEIGHT: 62.5 IN | BODY MASS INDEX: 41.09 KG/M2 | DIASTOLIC BLOOD PRESSURE: 83 MMHG | WEIGHT: 229 LBS | HEART RATE: 89 BPM | SYSTOLIC BLOOD PRESSURE: 127 MMHG

## 2024-05-24 PROCEDURE — 11900 INJECT SKIN LESIONS </W 7: CPT | Mod: 58

## 2024-05-24 PROCEDURE — 99024 POSTOP FOLLOW-UP VISIT: CPT

## 2024-05-28 NOTE — PROCEDURE
[FreeTextEntry1] : Right ear steroid injection [FreeTextEntry2] : Keloid [FreeTextEntry3] : Kenalog 40 NDC: 6004-9166-21 Lot#: [id number] Exp: 11/2025  After informed consent, the right ear incision was injected with 0.5 cc of kenalog 40. Patient tolerated well.

## 2024-05-28 NOTE — ASSESSMENT
[FreeTextEntry1] : 38 year old female with right ear keloid sp excision and kenalog injections postop x 3. To return in 1 month for check and possible re-injection.

## 2024-05-28 NOTE — HISTORY OF PRESENT ILLNESS
[de-identified] : 38 year old female with history of right ear keloid. s/p right ear keloid excision, adjacent tissue transfer 10 cm 2 Path c/w Keloid.  Patient states she has healed well. No current discomfort, erythema or drainage. Received Kenalog injection x 2 to prevent reoccurrence.

## 2024-05-31 DIAGNOSIS — N72 INFLAMMATORY DISEASE OF CERVIX UTERI: ICD-10-CM

## 2024-06-10 ENCOUNTER — APPOINTMENT (OUTPATIENT)
Dept: DERMATOLOGY | Facility: CLINIC | Age: 39
End: 2024-06-10

## 2024-06-14 ENCOUNTER — TRANSCRIPTION ENCOUNTER (OUTPATIENT)
Age: 39
End: 2024-06-14

## 2024-06-21 ENCOUNTER — APPOINTMENT (OUTPATIENT)
Dept: OTOLARYNGOLOGY | Facility: CLINIC | Age: 39
End: 2024-06-21
Payer: COMMERCIAL

## 2024-06-21 VITALS — HEIGHT: 62.5 IN | BODY MASS INDEX: 41.09 KG/M2 | WEIGHT: 229 LBS

## 2024-06-21 PROCEDURE — 99213 OFFICE O/P EST LOW 20 MIN: CPT

## 2024-06-21 RX ORDER — DOXYCYCLINE HYCLATE 100 MG/1
100 TABLET ORAL TWICE DAILY
Qty: 14 | Refills: 0 | Status: COMPLETED | COMMUNITY
Start: 2024-05-31 | End: 2024-06-21

## 2024-06-21 NOTE — PHYSICAL EXAM
[de-identified] : right ear lobe scar healing well, slight hypertrophy [Midline] : trachea located in midline position [Normal] : no rashes

## 2024-06-21 NOTE — HISTORY OF PRESENT ILLNESS
[de-identified] : 38 year old female with history of right ear keloid. s/p right ear keloid excision, adjacent tissue transfer 10 cm 2 Path c/w Keloid.  Patient states she has healed well. No current discomfort, erythema or drainage. Received Kenalog injection x 3 to prevent reoccurrence.

## 2024-06-21 NOTE — ASSESSMENT
[FreeTextEntry1] : 38 year old female with right ear keloid sp excision and kenalog injections postop x 3. Injected with 5 FU today. Return as needed.

## 2024-06-21 NOTE — PROCEDURE
[FreeTextEntry1] : Right ear steroid injection [FreeTextEntry2] : Keloid [FreeTextEntry3] :   After informed consent, the right ear incision was injected with -.3 cc of 5FU. Tolerated well.

## 2024-07-24 ENCOUNTER — APPOINTMENT (OUTPATIENT)
Dept: OBGYN | Facility: CLINIC | Age: 39
End: 2024-07-24
Payer: COMMERCIAL

## 2024-07-24 PROCEDURE — 90471 IMMUNIZATION ADMIN: CPT

## 2024-07-24 PROCEDURE — 0502F SUBSEQUENT PRENATAL CARE: CPT

## 2024-07-24 PROCEDURE — 90651 9VHPV VACCINE 2/3 DOSE IM: CPT

## 2024-08-06 ENCOUNTER — APPOINTMENT (OUTPATIENT)
Dept: BARIATRICS | Facility: CLINIC | Age: 39
End: 2024-08-06

## 2024-08-06 PROCEDURE — 99214 OFFICE O/P EST MOD 30 MIN: CPT

## 2024-08-06 NOTE — ASSESSMENT
[FreeTextEntry1] : 39-year-old F with a longstanding history of obesity presents for a weight-loss medication follow-up. Pt currently on Zepbound 5 mg/week; 6 lb weight gain since last visit 3 months ago. Reports decrease in appetite (improvement from Wegovy) and is eating smaller portions. Nutrition and exercise guidelines reviewed with the patient.    Patient will work on the following: -Eliminate snacks and daytime grazing, especially after dinner snacking (try drinking water instead -Focus on eating 3 well-balanced meals during the daytime with appropriate portion size; front-load calories; can   drink a protein shake for breakfast (limit to one per day as meal replacement); consider adding protein powder to   steel cut oats, eating Greek yogurt with berries, or eggs for breakfast. -Increase protein consumption (aim for 60-70 g protein/day) -Cooking fresh meals rather than take out/processed/ready-made foods -Drinking 64oz of zero-calorie liquids daily -Avoid artificial sweeteners; can add fruit like Watermelon, Lemon, Cucumber to water; consider sparkling mineral   water instead -Incorporating exercise with 150 minutes of Cardio- and strength-training per week -Bloodwork done 5/8/24. Patient to obtain new bloodwork ~11/8/24. -Increase dose of Zepbound to 10 mg/week  Discussed with the pt of the warnings of pregnancy while on Zepbound: - instructed pt to avoid planned pregnancy and use of contraception (pt currently has IUD) - advised pt to stop immediately if becomes pregnant Pt verbalized understanding of the above   Pt will call office immediately for any side effects. Pt verbalizes understanding and wishes to proceed with medical therapy. Patient educated to call with questions/concerns. All questions answered.   Return to office 2 months after increasing dose of Zepbound to 10 mg/week   Additional time spent before and after visit reviewing chart.

## 2024-08-06 NOTE — HISTORY OF PRESENT ILLNESS
[de-identified] : 39-year-old F with a longstanding history of obesity presents for a weight-loss medication follow-up. Pt currently on Zepbound 5 mg/week (on second dose); 6 lb weight gain since last visit 3 months ago. (Started Zepbound 2 weeks ago). Reports decrease in appetite (improvement from Wegovy) and is eating smaller portions. Reports no abdominal pain, nausea/vomiting, constipation, diarrhea, reflux/heartburn. Pt is interested in increasing dosage of medication   Patient eating 2-3 meals/day (skips breakfast) and daytime snacking (1x/day) on cheese or nuts, drinking 64 oz zero-calorie fluids/day, and exercising by walking 3 days/week; sleeps 7 hrs/night.   Starting weight at initial consult: 246 lbs Current weight at today's visit: 234 lbs   Anti-obesity medication(s): Zepbound (switched from Wegovy 2.4 mg since last visit due to poor appetite suppression on Wegovy) Start date: 7/28/24 (started wegovy 06/2023) Current dose: Zepbound 5 mg/week Side-effects from anti-obesity medication(s): none Obesity comorbidities: HTN, PCOS Comorbidities improved/resolved: none History of bariatric surgery: no

## 2024-08-06 NOTE — HISTORY OF PRESENT ILLNESS
[de-identified] : 39-year-old F with a longstanding history of obesity presents for a weight-loss medication follow-up. Pt currently on Zepbound 5 mg/week (on second dose); 6 lb weight gain since last visit 3 months ago. (Started Zepbound 2 weeks ago). Reports decrease in appetite (improvement from Wegovy) and is eating smaller portions. Reports no abdominal pain, nausea/vomiting, constipation, diarrhea, reflux/heartburn. Pt is interested in increasing dosage of medication   Patient eating 2-3 meals/day (skips breakfast) and daytime snacking (1x/day) on cheese or nuts, drinking 64 oz zero-calorie fluids/day, and exercising by walking 3 days/week; sleeps 7 hrs/night.   Starting weight at initial consult: 246 lbs Current weight at today's visit: 234 lbs   Anti-obesity medication(s): Zepbound (switched from Wegovy 2.4 mg since last visit due to poor appetite suppression on Wegovy) Start date: 7/28/24 (started wegovy 06/2023) Current dose: Zepbound 5 mg/week Side-effects from anti-obesity medication(s): none Obesity comorbidities: HTN, PCOS Comorbidities improved/resolved: none History of bariatric surgery: no

## 2024-08-06 NOTE — PHYSICAL EXAM
[Normal] : supple without JVD, no thyromegaly or masses appreciated [de-identified] : Well, healthy-appearing adult [de-identified] : Equal chest rise, non-labored respirations, no audible wheezing. [de-identified] : Soft, NT, ND, no diastasis or hernias appreciated [de-identified] : CEM

## 2024-08-06 NOTE — PHYSICAL EXAM
[Normal] : supple without JVD, no thyromegaly or masses appreciated [de-identified] : Well, healthy-appearing adult [de-identified] : Equal chest rise, non-labored respirations, no audible wheezing. [de-identified] : Soft, NT, ND, no diastasis or hernias appreciated [de-identified] : CEM

## 2024-09-11 ENCOUNTER — APPOINTMENT (OUTPATIENT)
Dept: OBGYN | Facility: HOSPITAL | Age: 39
End: 2024-09-11

## 2024-09-30 ENCOUNTER — LABORATORY RESULT (OUTPATIENT)
Age: 39
End: 2024-09-30

## 2024-09-30 ENCOUNTER — APPOINTMENT (OUTPATIENT)
Dept: OBGYN | Facility: CLINIC | Age: 39
End: 2024-09-30

## 2024-09-30 VITALS
DIASTOLIC BLOOD PRESSURE: 83 MMHG | WEIGHT: 235 LBS | HEART RATE: 84 BPM | HEIGHT: 62 IN | SYSTOLIC BLOOD PRESSURE: 131 MMHG | BODY MASS INDEX: 43.24 KG/M2

## 2024-09-30 DIAGNOSIS — Z97.5 EXCESSIVE AND FREQUENT MENSTRUATION WITH IRREGULAR CYCLE: ICD-10-CM

## 2024-09-30 DIAGNOSIS — N92.1 EXCESSIVE AND FREQUENT MENSTRUATION WITH IRREGULAR CYCLE: ICD-10-CM

## 2024-09-30 DIAGNOSIS — Z11.51 ENCOUNTER FOR SCREENING FOR HUMAN PAPILLOMAVIRUS (HPV): ICD-10-CM

## 2024-09-30 DIAGNOSIS — Z12.4 ENCOUNTER FOR SCREENING FOR MALIGNANT NEOPLASM OF CERVIX: ICD-10-CM

## 2024-09-30 PROCEDURE — 58301 REMOVE INTRAUTERINE DEVICE: CPT

## 2024-09-30 NOTE — PROCEDURE
[IUD Removal] : intrauterine device (IUD) removal [Time out performed] : Pre-procedure time out performed.  Patient's name, date of birth and procedure confirmed. [Consent Obtained] : Consent obtained [Fertility Desired] : fertility desired [Pregnancy] : pregnancy [Risks] : risks [Benefits] : benefits [Alternatives] : alternatives [Patient] : patient [Speculum Placed] : speculum placed [Strings Visualized] : strings visualized [IUD Discarded] : IUD discarded [Sent to Pathology] : specimen was placed in buffered formalin and sent for pathology [Tolerated Well] : Patient tolerated the procedure well [No Complications] : no complications [Heavy Vaginal Bleeding] : for heavy vaginal bleeding [Pelvic Pain] : for pelvic pain [FreeTextEntry6] : March for annual

## 2024-10-01 LAB — HPV HIGH+LOW RISK DNA PNL CVX: DETECTED

## 2024-10-03 ENCOUNTER — APPOINTMENT (OUTPATIENT)
Dept: BARIATRICS | Facility: CLINIC | Age: 39
End: 2024-10-03
Payer: COMMERCIAL

## 2024-10-03 VITALS — WEIGHT: 232 LBS | HEIGHT: 62 IN | BODY MASS INDEX: 42.69 KG/M2

## 2024-10-03 DIAGNOSIS — R63.4 ABNORMAL WEIGHT LOSS: ICD-10-CM

## 2024-10-03 DIAGNOSIS — E66.01 MORBID (SEVERE) OBESITY DUE TO EXCESS CALORIES: ICD-10-CM

## 2024-10-03 DIAGNOSIS — Z79.899 OTHER LONG TERM (CURRENT) DRUG THERAPY: ICD-10-CM

## 2024-10-03 DIAGNOSIS — Z71.82 EXERCISE COUNSELING: ICD-10-CM

## 2024-10-03 PROCEDURE — G2211 COMPLEX E/M VISIT ADD ON: CPT

## 2024-10-03 PROCEDURE — 99214 OFFICE O/P EST MOD 30 MIN: CPT

## 2024-10-03 NOTE — PHYSICAL EXAM
[Normal] : affect appropriate [de-identified] : Well, healthy appearing adult  [de-identified] : EOMI, no conjunctival injection, anicteric  [de-identified] : deferred as visit is TEB  [de-identified] : ANO x 3

## 2024-10-03 NOTE — REASON FOR VISIT
[Other Location: e.g. School (Enter Location, City,State)___] : at [unfilled], at the time of the visit. [Medical Office: ([facility])___] : at the medical office located in  [Follow-Up Visit] : a follow-up visit for [Other___] : [unfilled] [Patient] : the patient [Self] : self

## 2024-10-03 NOTE — PHYSICAL EXAM
[Normal] : affect appropriate [de-identified] : Well, healthy appearing adult  [de-identified] : EOMI, no conjunctival injection, anicteric  [de-identified] : deferred as visit is TEB  [de-identified] : ANO x 3

## 2024-10-03 NOTE — ASSESSMENT
[FreeTextEntry1] : 39 year old F with a long-standing h/o obesity, who presents today for follow up after starting weight loss medication Zepbound. Weight loss since last visit. Pt wishes to begin planning for pregnancy March 2025. Doing well overall.   Reviewed guidelines about nutrition and snacking - protein focus diet with 3 meals/day Discussed food choices and timing of meals with front loading early in the day Avoid processed/refined foods Increase daily zero calorie liquid intake, goal of 64 oz/day Maintain daily exercise regimen incorporating cardio and strength training as tolerated Discussed importance of strength training Exercise packet given and exercises to be mailed to pt Daily goal 8-10k steps/day  Pt currently has IUD and has plans to switch to OCPs per GYN as pt wishes to begin family planning March 2025 Reiterated to pt the warnings of pregnancy while currently on anti-obesity medication(s)   - instructed pt to avoid planned pregnancy   - advised pt to stop anti-obesity medications immediately if becomes pregnant   - call the office Discussed in length with the pt that will need to stop Zepbound at least 2 mos prior to pt starting family planning, as well will need to taper down Zepbound dose  Instructed pt to complete the remaining Zepbound 10mg pens - last pen to end 11/16/2024 Upon completing 10mg will start Zepbound 5mg on 11/23/2024 to end in Jan 2025 Prescription for Zepbound 5mg x 2mos sent to [facility] Instructed pt to call the office sooner for issues/concerns All questions answered Pt verbalized understanding and in agreement of the above      Additional time spent before and after visit reviewing chart

## 2024-10-03 NOTE — REASON FOR VISIT
[Other Location: e.g. School (Enter Location, City,State)___] : at [unfilled], at the time of the visit. [Medical Office: ([facility])___] : at the medical office located in  [Follow-Up Visit] : a follow-up visit for [Other___] : [unfilled] [Patient] : the patient [Self] : self

## 2024-10-03 NOTE — HISTORY OF PRESENT ILLNESS
[de-identified] : 39 year old F with a long-standing h/o obesity, who presents today for follow up after starting weight loss medication Zepbound. Weight loss since last visit. Consuming adequate protein intake with 3 meals/day. Daily water intake adequate ~40-64oz/day. Walking for activities, plus kickboxing though limited due to work schedule. Sleeping well. No abdominal pain, reflux, nausea, vomiting, constipation or diarrhea.  Pt adds wishes to start planning for pregnancy March 2025.   Starting weight at initial consult: 246 lbs Current weight at today's visit: 232 lbs  Anti-obesity medication(s): Zepbound (switched from Wegovy 2.4 mg due to poor appetite suppression) Start date: July 2024 (started Wegovy June 2023) Current dose: 10mg (injection #1 of box#2 out of 3 9/28/2024) Side-effects from anti-obesity medication(s): pt reports none Obesity comorbidities: HTN, PCOS Comorbidities improved/resolved: N/A History of bariatric surgery: No

## 2024-10-06 LAB — ACTINOMYCES CULTURE FOR IUD: NORMAL

## 2024-10-09 LAB — CYTOLOGY CVX/VAG DOC THIN PREP: ABNORMAL

## 2024-10-15 ENCOUNTER — NON-APPOINTMENT (OUTPATIENT)
Age: 39
End: 2024-10-15

## 2024-10-26 PROBLEM — Z30.432 ENCOUNTER FOR IUD REMOVAL: Status: ACTIVE | Noted: 2024-10-26 | Resolved: 2024-11-09

## 2024-11-22 NOTE — ASU PATIENT PROFILE, ADULT - DATE OF LAST VACCINATION
[facility] calling for 3 month supply for Budesonide, nebulizer solution,thanks.   
Chart reviewed  Budesonide was ordered 11/6/24  Mom contacted-states refill is not needed   
19-Jan-2022

## 2024-11-25 ENCOUNTER — APPOINTMENT (OUTPATIENT)
Dept: OBGYN | Facility: CLINIC | Age: 39
End: 2024-11-25
Payer: COMMERCIAL

## 2024-11-25 PROCEDURE — 90651 9VHPV VACCINE 2/3 DOSE IM: CPT

## 2024-11-25 PROCEDURE — 90471 IMMUNIZATION ADMIN: CPT

## 2024-12-13 ENCOUNTER — NON-APPOINTMENT (OUTPATIENT)
Age: 39
End: 2024-12-13

## 2024-12-31 ENCOUNTER — TRANSCRIPTION ENCOUNTER (OUTPATIENT)
Age: 39
End: 2024-12-31

## 2025-02-11 DIAGNOSIS — N60.19 DIFFUSE CYSTIC MASTOPATHY OF UNSPECIFIED BREAST: ICD-10-CM

## 2025-02-11 DIAGNOSIS — Z12.39 ENCOUNTER FOR OTHER SCREENING FOR MALIGNANT NEOPLASM OF BREAST: ICD-10-CM

## 2025-03-07 ENCOUNTER — OUTPATIENT (OUTPATIENT)
Dept: OUTPATIENT SERVICES | Facility: HOSPITAL | Age: 40
LOS: 1 days | End: 2025-03-07
Payer: COMMERCIAL

## 2025-03-07 ENCOUNTER — APPOINTMENT (OUTPATIENT)
Dept: MAMMOGRAPHY | Facility: IMAGING CENTER | Age: 40
End: 2025-03-07
Payer: COMMERCIAL

## 2025-03-07 ENCOUNTER — APPOINTMENT (OUTPATIENT)
Dept: ULTRASOUND IMAGING | Facility: IMAGING CENTER | Age: 40
End: 2025-03-07
Payer: COMMERCIAL

## 2025-03-07 ENCOUNTER — RESULT REVIEW (OUTPATIENT)
Age: 40
End: 2025-03-07

## 2025-03-07 DIAGNOSIS — Z00.8 ENCOUNTER FOR OTHER GENERAL EXAMINATION: ICD-10-CM

## 2025-03-07 DIAGNOSIS — Z98.890 OTHER SPECIFIED POSTPROCEDURAL STATES: Chronic | ICD-10-CM

## 2025-03-07 PROCEDURE — 76641 ULTRASOUND BREAST COMPLETE: CPT | Mod: 26,50

## 2025-03-07 PROCEDURE — 77067 SCR MAMMO BI INCL CAD: CPT | Mod: 26

## 2025-03-07 PROCEDURE — 77063 BREAST TOMOSYNTHESIS BI: CPT | Mod: 26

## 2025-03-07 PROCEDURE — 77063 BREAST TOMOSYNTHESIS BI: CPT

## 2025-03-07 PROCEDURE — 76641 ULTRASOUND BREAST COMPLETE: CPT

## 2025-03-07 PROCEDURE — 77067 SCR MAMMO BI INCL CAD: CPT

## 2025-03-10 ENCOUNTER — APPOINTMENT (OUTPATIENT)
Dept: HUMAN REPRODUCTION | Facility: CLINIC | Age: 40
End: 2025-03-10

## 2025-03-12 ENCOUNTER — APPOINTMENT (OUTPATIENT)
Dept: OBGYN | Facility: CLINIC | Age: 40
End: 2025-03-12
Payer: COMMERCIAL

## 2025-03-12 ENCOUNTER — APPOINTMENT (OUTPATIENT)
Dept: ULTRASOUND IMAGING | Facility: IMAGING CENTER | Age: 40
End: 2025-03-12
Payer: COMMERCIAL

## 2025-03-12 ENCOUNTER — OUTPATIENT (OUTPATIENT)
Dept: OUTPATIENT SERVICES | Facility: HOSPITAL | Age: 40
LOS: 1 days | End: 2025-03-12
Payer: COMMERCIAL

## 2025-03-12 ENCOUNTER — ASOB RESULT (OUTPATIENT)
Age: 40
End: 2025-03-12

## 2025-03-12 ENCOUNTER — RESULT REVIEW (OUTPATIENT)
Age: 40
End: 2025-03-12

## 2025-03-12 ENCOUNTER — NON-APPOINTMENT (OUTPATIENT)
Age: 40
End: 2025-03-12

## 2025-03-12 VITALS
SYSTOLIC BLOOD PRESSURE: 132 MMHG | DIASTOLIC BLOOD PRESSURE: 79 MMHG | HEART RATE: 78 BPM | BODY MASS INDEX: 45.64 KG/M2 | WEIGHT: 248 LBS | HEIGHT: 62 IN

## 2025-03-12 DIAGNOSIS — Z00.8 ENCOUNTER FOR OTHER GENERAL EXAMINATION: ICD-10-CM

## 2025-03-12 DIAGNOSIS — D25.9 LEIOMYOMA OF UTERUS, UNSPECIFIED: ICD-10-CM

## 2025-03-12 DIAGNOSIS — Z98.890 OTHER SPECIFIED POSTPROCEDURAL STATES: Chronic | ICD-10-CM

## 2025-03-12 DIAGNOSIS — N94.9 UNSPECIFIED CONDITION ASSOCIATED WITH FEMALE GENITAL ORGANS AND MENSTRUAL CYCLE: ICD-10-CM

## 2025-03-12 DIAGNOSIS — R93.89 ABNORMAL FINDINGS ON DIAGNOSTIC IMAGING OF OTHER SPECIFIED BODY STRUCTURES: ICD-10-CM

## 2025-03-12 DIAGNOSIS — Z01.411 ENCOUNTER FOR GYNECOLOGICAL EXAMINATION (GENERAL) (ROUTINE) WITH ABNORMAL FINDINGS: ICD-10-CM

## 2025-03-12 DIAGNOSIS — N93.9 ABNORMAL UTERINE AND VAGINAL BLEEDING, UNSPECIFIED: ICD-10-CM

## 2025-03-12 DIAGNOSIS — R87.610 ATYPICAL SQUAMOUS CELLS OF UNDETERMINED SIGNIFICANCE ON CYTOLOGIC SMEAR OF CERVIX (ASC-US): ICD-10-CM

## 2025-03-12 PROCEDURE — 99214 OFFICE O/P EST MOD 30 MIN: CPT | Mod: 25

## 2025-03-12 PROCEDURE — 76642 ULTRASOUND BREAST LIMITED: CPT | Mod: 26,LT

## 2025-03-12 PROCEDURE — 76642 ULTRASOUND BREAST LIMITED: CPT

## 2025-03-12 PROCEDURE — ZZZZZ: CPT

## 2025-03-12 PROCEDURE — 99396 PREV VISIT EST AGE 40-64: CPT

## 2025-03-12 PROCEDURE — 99459 PELVIC EXAMINATION: CPT

## 2025-03-12 PROCEDURE — 76856 US EXAM PELVIC COMPLETE: CPT

## 2025-03-12 RX ORDER — MEDROXYPROGESTERONE ACETATE 10 MG/1
10 TABLET ORAL DAILY
Qty: 15 | Refills: 0 | Status: ACTIVE | COMMUNITY
Start: 2025-03-12 | End: 1900-01-01

## 2025-03-17 ENCOUNTER — APPOINTMENT (OUTPATIENT)
Dept: HUMAN REPRODUCTION | Facility: CLINIC | Age: 40
End: 2025-03-17
Payer: COMMERCIAL

## 2025-03-17 PROBLEM — R93.89 THICKENED ENDOMETRIUM: Status: ACTIVE | Noted: 2025-03-17

## 2025-03-17 PROBLEM — Z01.411 ENCOUNTER FOR WELL WOMAN EXAM WITH ABNORMAL FINDINGS: Status: ACTIVE | Noted: 2025-03-17

## 2025-03-17 PROBLEM — D25.9 FIBROID UTERUS: Status: ACTIVE | Noted: 2025-03-17

## 2025-03-17 PROBLEM — N94.9 ADNEXAL FULLNESS: Status: ACTIVE | Noted: 2025-03-17

## 2025-03-17 PROCEDURE — 99215 OFFICE O/P EST HI 40 MIN: CPT | Mod: 25

## 2025-03-17 PROCEDURE — 76830 TRANSVAGINAL US NON-OB: CPT

## 2025-03-17 PROCEDURE — 36415 COLL VENOUS BLD VENIPUNCTURE: CPT

## 2025-03-26 ENCOUNTER — ASOB RESULT (OUTPATIENT)
Age: 40
End: 2025-03-26

## 2025-03-26 ENCOUNTER — LABORATORY RESULT (OUTPATIENT)
Age: 40
End: 2025-03-26

## 2025-03-26 ENCOUNTER — APPOINTMENT (OUTPATIENT)
Dept: OBGYN | Facility: CLINIC | Age: 40
End: 2025-03-26
Payer: COMMERCIAL

## 2025-03-26 VITALS
BODY MASS INDEX: 46.19 KG/M2 | DIASTOLIC BLOOD PRESSURE: 88 MMHG | WEIGHT: 251 LBS | SYSTOLIC BLOOD PRESSURE: 126 MMHG | HEIGHT: 62 IN | HEART RATE: 83 BPM

## 2025-03-26 DIAGNOSIS — N93.9 ABNORMAL UTERINE AND VAGINAL BLEEDING, UNSPECIFIED: ICD-10-CM

## 2025-03-26 DIAGNOSIS — R93.89 ABNORMAL FINDINGS ON DIAGNOSTIC IMAGING OF OTHER SPECIFIED BODY STRUCTURES: ICD-10-CM

## 2025-03-26 DIAGNOSIS — D25.9 LEIOMYOMA OF UTERUS, UNSPECIFIED: ICD-10-CM

## 2025-03-26 DIAGNOSIS — Z11.51 ENCOUNTER FOR SCREENING FOR HUMAN PAPILLOMAVIRUS (HPV): ICD-10-CM

## 2025-03-26 DIAGNOSIS — R87.610 ATYPICAL SQUAMOUS CELLS OF UNDETERMINED SIGNIFICANCE ON CYTOLOGIC SMEAR OF CERVIX (ASC-US): ICD-10-CM

## 2025-03-26 PROCEDURE — 76830 TRANSVAGINAL US NON-OB: CPT | Mod: 59

## 2025-03-26 PROCEDURE — 99459 PELVIC EXAMINATION: CPT

## 2025-03-26 PROCEDURE — 99214 OFFICE O/P EST MOD 30 MIN: CPT | Mod: 25

## 2025-03-26 PROCEDURE — 57454 BX/CURETT OF CERVIX W/SCOPE: CPT

## 2025-03-27 LAB — HPV HIGH+LOW RISK DNA PNL CVX: DETECTED

## 2025-03-31 LAB — CORE LAB BIOPSY: NORMAL

## 2025-04-02 LAB — CYTOLOGY CVX/VAG DOC THIN PREP: ABNORMAL

## 2025-04-10 ENCOUNTER — NON-APPOINTMENT (OUTPATIENT)
Age: 40
End: 2025-04-10

## 2025-04-26 ENCOUNTER — NON-APPOINTMENT (OUTPATIENT)
Age: 40
End: 2025-04-26

## 2025-04-30 ENCOUNTER — LABORATORY RESULT (OUTPATIENT)
Age: 40
End: 2025-04-30

## 2025-04-30 ENCOUNTER — APPOINTMENT (OUTPATIENT)
Dept: DERMATOLOGY | Facility: CLINIC | Age: 40
End: 2025-04-30
Payer: COMMERCIAL

## 2025-04-30 VITALS — HEIGHT: 62 IN | BODY MASS INDEX: 46.01 KG/M2 | WEIGHT: 250 LBS

## 2025-04-30 DIAGNOSIS — L82.1 OTHER SEBORRHEIC KERATOSIS: ICD-10-CM

## 2025-04-30 DIAGNOSIS — D22.9 MELANOCYTIC NEVI, UNSPECIFIED: ICD-10-CM

## 2025-04-30 PROBLEM — B00.9 HSV INFECTION: Status: ACTIVE | Noted: 2025-04-30

## 2025-04-30 PROCEDURE — 99214 OFFICE O/P EST MOD 30 MIN: CPT

## 2025-04-30 RX ORDER — VALACYCLOVIR 1 G/1
1 TABLET, FILM COATED ORAL
Qty: 20 | Refills: 0 | Status: ACTIVE | COMMUNITY
Start: 2025-04-30 | End: 1900-01-01

## 2025-05-05 ENCOUNTER — TRANSCRIPTION ENCOUNTER (OUTPATIENT)
Age: 40
End: 2025-05-05

## 2025-05-05 RX ORDER — VALACYCLOVIR 1 G/1
1 TABLET, FILM COATED ORAL
Qty: 12 | Refills: 2 | Status: ACTIVE | COMMUNITY
Start: 2025-05-05 | End: 1900-01-01

## 2025-05-06 ENCOUNTER — NON-APPOINTMENT (OUTPATIENT)
Age: 40
End: 2025-05-06

## 2025-05-07 ENCOUNTER — APPOINTMENT (OUTPATIENT)
Dept: OBGYN | Facility: CLINIC | Age: 40
End: 2025-05-07
Payer: COMMERCIAL

## 2025-05-07 ENCOUNTER — ASOB RESULT (OUTPATIENT)
Age: 40
End: 2025-05-07

## 2025-05-07 ENCOUNTER — LABORATORY RESULT (OUTPATIENT)
Age: 40
End: 2025-05-07

## 2025-05-07 VITALS
DIASTOLIC BLOOD PRESSURE: 66 MMHG | WEIGHT: 254 LBS | BODY MASS INDEX: 46.74 KG/M2 | HEIGHT: 62 IN | SYSTOLIC BLOOD PRESSURE: 118 MMHG | HEART RATE: 77 BPM

## 2025-05-07 DIAGNOSIS — B00.9 HERPESVIRAL INFECTION, UNSPECIFIED: ICD-10-CM

## 2025-05-07 DIAGNOSIS — N93.9 ABNORMAL UTERINE AND VAGINAL BLEEDING, UNSPECIFIED: ICD-10-CM

## 2025-05-07 DIAGNOSIS — D25.9 LEIOMYOMA OF UTERUS, UNSPECIFIED: ICD-10-CM

## 2025-05-07 DIAGNOSIS — N76.0 ACUTE VAGINITIS: ICD-10-CM

## 2025-05-07 PROCEDURE — 99214 OFFICE O/P EST MOD 30 MIN: CPT | Mod: 25

## 2025-05-07 PROCEDURE — 99459 PELVIC EXAMINATION: CPT

## 2025-05-07 PROCEDURE — 76856 US EXAM PELVIC COMPLETE: CPT

## 2025-05-07 RX ORDER — VALACYCLOVIR 1 G/1
1 TABLET, FILM COATED ORAL DAILY
Qty: 30 | Refills: 1 | Status: ACTIVE | COMMUNITY
Start: 2025-05-07 | End: 1900-01-01

## 2025-05-08 ENCOUNTER — ASOB RESULT (OUTPATIENT)
Age: 40
End: 2025-05-08

## 2025-05-08 ENCOUNTER — APPOINTMENT (OUTPATIENT)
Dept: MATERNAL FETAL MEDICINE | Facility: CLINIC | Age: 40
End: 2025-05-08

## 2025-05-08 LAB
BASOPHILS # BLD AUTO: 0.14 K/UL
BASOPHILS NFR BLD AUTO: 1 %
EOSINOPHIL # BLD AUTO: 0.21 K/UL
EOSINOPHIL NFR BLD AUTO: 1.4 %
HAV IGM SER QL: NONREACTIVE
HBV CORE IGM SER QL: NONREACTIVE
HBV SURFACE AG SER QL: NONREACTIVE
HCT VFR BLD CALC: 38.9 %
HCV AB SER QL: NONREACTIVE
HCV S/CO RATIO: 0.45 S/CO
HGB BLD-MCNC: 12.2 G/DL
IMM GRANULOCYTES NFR BLD AUTO: 2 %
LYMPHOCYTES # BLD AUTO: 4.53 K/UL
LYMPHOCYTES NFR BLD AUTO: 30.9 %
MAN DIFF?: NORMAL
MCHC RBC-ENTMCNC: 25.6 PG
MCHC RBC-ENTMCNC: 31.4 G/DL
MCV RBC AUTO: 81.7 FL
MONOCYTES # BLD AUTO: 0.89 K/UL
MONOCYTES NFR BLD AUTO: 6.1 %
NEUTROPHILS # BLD AUTO: 8.6 K/UL
NEUTROPHILS NFR BLD AUTO: 58.6 %
PLATELET # BLD AUTO: 467 K/UL
RBC # BLD: 4.76 M/UL
RBC # FLD: 13.8 %
T PALLIDUM AB SER QL IA: NEGATIVE
WBC # FLD AUTO: 14.67 K/UL

## 2025-05-08 PROCEDURE — 99204 OFFICE O/P NEW MOD 45 MIN: CPT | Mod: 95

## 2025-05-10 LAB — B-HEM STREP SPEC QL CULT: NORMAL

## 2025-05-11 LAB
A VAGINAE DNA VAG QL NAA+PROBE: NORMAL
BVAB2 DNA VAG QL NAA+PROBE: NORMAL
C KRUSEI DNA VAG QL NAA+PROBE: NEGATIVE
C KRUSEI DNA VAG QL NAA+PROBE: POSITIVE
C TRACH RRNA SPEC QL NAA+PROBE: NEGATIVE
CANDIDA DNA VAG QL NAA+PROBE: NEGATIVE
MEGA1 DNA VAG QL NAA+PROBE: NORMAL
N GONORRHOEA RRNA SPEC QL NAA+PROBE: NEGATIVE
T VAGINALIS RRNA SPEC QL NAA+PROBE: NEGATIVE

## 2025-05-14 LAB
MYCOPLASMA HOMINIS CULTURE: NEGATIVE
UREAPLASMA CULTURE: NEGATIVE

## 2025-05-20 ENCOUNTER — APPOINTMENT (OUTPATIENT)
Dept: NEUROLOGY | Facility: CLINIC | Age: 40
End: 2025-05-20

## 2025-06-19 ENCOUNTER — APPOINTMENT (OUTPATIENT)
Dept: OTOLARYNGOLOGY | Facility: CLINIC | Age: 40
End: 2025-06-19
Payer: COMMERCIAL

## 2025-06-19 VITALS
DIASTOLIC BLOOD PRESSURE: 88 MMHG | BODY MASS INDEX: 44.16 KG/M2 | OXYGEN SATURATION: 96 % | HEART RATE: 59 BPM | HEIGHT: 62 IN | SYSTOLIC BLOOD PRESSURE: 130 MMHG | WEIGHT: 240 LBS

## 2025-06-19 PROBLEM — Z71.1 WORRIED WELL: Status: ACTIVE | Noted: 2025-06-19

## 2025-06-19 PROCEDURE — 99212 OFFICE O/P EST SF 10 MIN: CPT

## 2025-06-25 ENCOUNTER — APPOINTMENT (OUTPATIENT)
Dept: CARDIOLOGY | Facility: CLINIC | Age: 40
End: 2025-06-25
Payer: COMMERCIAL

## 2025-06-25 VITALS
OXYGEN SATURATION: 95 % | HEART RATE: 84 BPM | DIASTOLIC BLOOD PRESSURE: 82 MMHG | SYSTOLIC BLOOD PRESSURE: 126 MMHG | BODY MASS INDEX: 45.08 KG/M2 | WEIGHT: 245 LBS | HEIGHT: 62 IN

## 2025-06-25 PROCEDURE — 93000 ELECTROCARDIOGRAM COMPLETE: CPT

## 2025-06-25 PROCEDURE — 99204 OFFICE O/P NEW MOD 45 MIN: CPT

## 2025-07-09 ENCOUNTER — APPOINTMENT (OUTPATIENT)
Dept: OBGYN | Facility: CLINIC | Age: 40
End: 2025-07-09
Payer: COMMERCIAL

## 2025-07-09 VITALS
SYSTOLIC BLOOD PRESSURE: 133 MMHG | HEART RATE: 94 BPM | HEIGHT: 62 IN | WEIGHT: 246 LBS | DIASTOLIC BLOOD PRESSURE: 84 MMHG | BODY MASS INDEX: 45.27 KG/M2

## 2025-07-09 PROBLEM — N76.0 ACUTE VAGINITIS: Status: ACTIVE | Noted: 2025-05-07

## 2025-07-09 PROBLEM — R87.612 LOW GRADE SQUAMOUS INTRAEPITHELIAL LESION (LGSIL) ON PAPANICOLAOU SMEAR OF CERVIX: Status: ACTIVE | Noted: 2025-07-09 | Resolved: 2025-10-07

## 2025-07-09 PROCEDURE — 99213 OFFICE O/P EST LOW 20 MIN: CPT | Mod: 25

## 2025-07-09 PROCEDURE — 57454 BX/CURETT OF CERVIX W/SCOPE: CPT

## 2025-07-09 PROCEDURE — 81025 URINE PREGNANCY TEST: CPT

## 2025-07-12 LAB — CORE LAB BIOPSY: NORMAL

## 2025-07-15 ENCOUNTER — NON-APPOINTMENT (OUTPATIENT)
Age: 40
End: 2025-07-15

## 2025-07-15 ENCOUNTER — RX RENEWAL (OUTPATIENT)
Age: 40
End: 2025-07-15

## 2025-07-15 LAB
A VAGINAE DNA VAG QL NAA+PROBE: ABNORMAL
BVAB2 DNA VAG QL NAA+PROBE: ABNORMAL
C KRUSEI DNA VAG QL NAA+PROBE: NEGATIVE
C KRUSEI DNA VAG QL NAA+PROBE: POSITIVE
C TRACH RRNA SPEC QL NAA+PROBE: NEGATIVE
CANDIDA DNA VAG QL NAA+PROBE: NEGATIVE
HCG UR QL: NEGATIVE
MEGA1 DNA VAG QL NAA+PROBE: NORMAL
N GONORRHOEA RRNA SPEC QL NAA+PROBE: NEGATIVE
QUALITY CONTROL: YES
T VAGINALIS RRNA SPEC QL NAA+PROBE: NEGATIVE

## 2025-07-15 RX ORDER — METRONIDAZOLE 500 MG/1
500 TABLET ORAL
Qty: 10 | Refills: 0 | Status: ACTIVE | COMMUNITY
Start: 2025-07-15 | End: 1900-01-01

## 2025-07-15 RX ORDER — FLUCONAZOLE 150 MG/1
150 TABLET ORAL
Qty: 2 | Refills: 0 | Status: ACTIVE | COMMUNITY
Start: 2025-07-15 | End: 1900-01-01

## 2025-08-08 ENCOUNTER — APPOINTMENT (OUTPATIENT)
Dept: CV DIAGNOSITCS | Facility: HOSPITAL | Age: 40
End: 2025-08-08

## 2025-08-08 ENCOUNTER — OUTPATIENT (OUTPATIENT)
Dept: OUTPATIENT SERVICES | Facility: HOSPITAL | Age: 40
LOS: 1 days | End: 2025-08-08
Payer: COMMERCIAL

## 2025-08-08 ENCOUNTER — RESULT REVIEW (OUTPATIENT)
Age: 40
End: 2025-08-08

## 2025-08-08 DIAGNOSIS — Z98.890 OTHER SPECIFIED POSTPROCEDURAL STATES: Chronic | ICD-10-CM

## 2025-08-08 DIAGNOSIS — I10 ESSENTIAL (PRIMARY) HYPERTENSION: ICD-10-CM

## 2025-08-08 PROCEDURE — 93306 TTE W/DOPPLER COMPLETE: CPT | Mod: 26

## 2025-08-12 ENCOUNTER — APPOINTMENT (OUTPATIENT)
Dept: CARDIOLOGY | Facility: CLINIC | Age: 40
End: 2025-08-12
Payer: COMMERCIAL

## 2025-08-12 VITALS
WEIGHT: 246 LBS | DIASTOLIC BLOOD PRESSURE: 76 MMHG | HEART RATE: 75 BPM | HEIGHT: 62 IN | OXYGEN SATURATION: 95 % | SYSTOLIC BLOOD PRESSURE: 121 MMHG | BODY MASS INDEX: 45.27 KG/M2

## 2025-08-12 DIAGNOSIS — I10 ESSENTIAL (PRIMARY) HYPERTENSION: ICD-10-CM

## 2025-08-12 PROCEDURE — 99214 OFFICE O/P EST MOD 30 MIN: CPT

## 2025-08-12 PROCEDURE — G2211 COMPLEX E/M VISIT ADD ON: CPT

## 2025-08-12 PROCEDURE — 93000 ELECTROCARDIOGRAM COMPLETE: CPT

## 2025-09-05 RX ORDER — NIFEDIPINE 60 MG/1
60 TABLET, EXTENDED RELEASE ORAL DAILY
Qty: 90 | Refills: 3 | Status: ACTIVE | COMMUNITY
Start: 2025-09-05 | End: 1900-01-01

## (undated) DEVICE — DRSG TELFA 3 X 8

## (undated) DEVICE — BASIN SET DOUBLE

## (undated) DEVICE — POSITIONER STRAP ARMBOARD VELCRO TS-30

## (undated) DEVICE — TUBING IRR SET FOR CYSTOSCOPY 77"

## (undated) DEVICE — DRAPE LIGHT HANDLE COVER (GREEN)

## (undated) DEVICE — TUBING SUCTION NONCONDUCTIVE 6MM X 12FT

## (undated) DEVICE — SOL IRR POUR H2O 500ML

## (undated) DEVICE — DRSG PAD SANITARY OB

## (undated) DEVICE — LABELS BLANK W PEN

## (undated) DEVICE — VENODYNE/SCD SLEEVE CALF MEDIUM

## (undated) DEVICE — DRAPE IRRIGATION POUCH 19X23"

## (undated) DEVICE — VISITEC 4X4

## (undated) DEVICE — GOWN LG

## (undated) DEVICE — DRAPE TOWEL BLUE 17" X 24"

## (undated) DEVICE — PROTECTOR HEEL / ELBOW FLUFFY

## (undated) DEVICE — PRESSURE INFUSOR BAG 1000ML

## (undated) DEVICE — SOL IRR POUR NS 0.9% 1000ML

## (undated) DEVICE — MARKING PEN W RULER

## (undated) DEVICE — PREP BETADINE SPONGE STICKS

## (undated) DEVICE — PACK PERI GYN